# Patient Record
Sex: FEMALE | Race: WHITE | NOT HISPANIC OR LATINO | Employment: PART TIME | URBAN - METROPOLITAN AREA
[De-identification: names, ages, dates, MRNs, and addresses within clinical notes are randomized per-mention and may not be internally consistent; named-entity substitution may affect disease eponyms.]

---

## 2019-01-14 ENCOUNTER — OFFICE VISIT - HEALTHEAST (OUTPATIENT)
Dept: INTERNAL MEDICINE | Facility: CLINIC | Age: 69
End: 2019-01-14

## 2019-01-14 ENCOUNTER — COMMUNICATION - HEALTHEAST (OUTPATIENT)
Dept: TELEHEALTH | Facility: CLINIC | Age: 69
End: 2019-01-14

## 2019-01-14 DIAGNOSIS — Z83.3 FAMILY HISTORY OF DIABETES MELLITUS IN MOTHER: ICD-10-CM

## 2019-01-14 DIAGNOSIS — M79.644 CHRONIC THUMB PAIN, BILATERAL: ICD-10-CM

## 2019-01-14 DIAGNOSIS — R53.83 FATIGUE, UNSPECIFIED TYPE: ICD-10-CM

## 2019-01-14 DIAGNOSIS — G89.29 CHRONIC THUMB PAIN, BILATERAL: ICD-10-CM

## 2019-01-14 DIAGNOSIS — R12 HEARTBURN: ICD-10-CM

## 2019-01-14 DIAGNOSIS — M79.645 CHRONIC THUMB PAIN, BILATERAL: ICD-10-CM

## 2019-01-14 LAB
ALBUMIN SERPL-MCNC: 4 G/DL (ref 3.5–5)
ALP SERPL-CCNC: 87 U/L (ref 45–120)
ALT SERPL W P-5'-P-CCNC: 19 U/L (ref 0–45)
ANION GAP SERPL CALCULATED.3IONS-SCNC: 11 MMOL/L (ref 5–18)
AST SERPL W P-5'-P-CCNC: 24 U/L (ref 0–40)
BILIRUB SERPL-MCNC: 0.6 MG/DL (ref 0–1)
BUN SERPL-MCNC: 19 MG/DL (ref 8–22)
CALCIUM SERPL-MCNC: 10.3 MG/DL (ref 8.5–10.5)
CHLORIDE BLD-SCNC: 103 MMOL/L (ref 98–107)
CO2 SERPL-SCNC: 26 MMOL/L (ref 22–31)
CREAT SERPL-MCNC: 0.73 MG/DL (ref 0.6–1.1)
ERYTHROCYTE [DISTWIDTH] IN BLOOD BY AUTOMATED COUNT: 11.6 % (ref 11–14.5)
ERYTHROCYTE [SEDIMENTATION RATE] IN BLOOD BY WESTERGREN METHOD: 7 MM/HR (ref 0–20)
GFR SERPL CREATININE-BSD FRML MDRD: >60 ML/MIN/1.73M2
GLUCOSE BLD-MCNC: 91 MG/DL (ref 70–125)
HCT VFR BLD AUTO: 42.3 % (ref 35–47)
HGB BLD-MCNC: 14 G/DL (ref 12–16)
MCH RBC QN AUTO: 31.9 PG (ref 27–34)
MCHC RBC AUTO-ENTMCNC: 33.2 G/DL (ref 32–36)
MCV RBC AUTO: 96 FL (ref 80–100)
PLATELET # BLD AUTO: 305 THOU/UL (ref 140–440)
PMV BLD AUTO: 6.7 FL (ref 7–10)
POTASSIUM BLD-SCNC: 4.7 MMOL/L (ref 3.5–5)
PROT SERPL-MCNC: 6.9 G/DL (ref 6–8)
RBC # BLD AUTO: 4.4 MILL/UL (ref 3.8–5.4)
RHEUMATOID FACT SERPL-ACNC: <15 IU/ML (ref 0–30)
SODIUM SERPL-SCNC: 140 MMOL/L (ref 136–145)
TSH SERPL DL<=0.005 MIU/L-ACNC: 1.61 UIU/ML (ref 0.3–5)
WBC: 8 THOU/UL (ref 4–11)

## 2019-01-14 RX ORDER — ESOMEPRAZOLE MAGNESIUM 40 MG
40 CAPSULE,DELAYED RELEASE (ENTERIC COATED) ORAL
Qty: 90 CAPSULE | Refills: 3 | Status: SHIPPED | OUTPATIENT
Start: 2019-01-14 | End: 2021-11-04

## 2019-01-14 RX ORDER — ZOLPIDEM TARTRATE 10 MG/1
5 TABLET ORAL
Status: SHIPPED | COMMUNITY
Start: 2019-01-14 | End: 2021-11-04

## 2019-01-15 ENCOUNTER — COMMUNICATION - HEALTHEAST (OUTPATIENT)
Dept: INTERNAL MEDICINE | Facility: CLINIC | Age: 69
End: 2019-01-15

## 2019-02-13 ENCOUNTER — TELEPHONE (OUTPATIENT)
Dept: RHEUMATOLOGY | Facility: CLINIC | Age: 69
End: 2019-02-13

## 2019-02-13 NOTE — TELEPHONE ENCOUNTER
Referral received from Hank Vilchis MD at [facility] for fatigue and chronic thumb pain.   Sierra Wilson CMA  2/13/2019 2:45 PM

## 2019-02-19 ENCOUNTER — OFFICE VISIT - HEALTHEAST (OUTPATIENT)
Dept: RHEUMATOLOGY | Facility: CLINIC | Age: 69
End: 2019-02-19

## 2019-02-19 DIAGNOSIS — M79.644 CHRONIC THUMB PAIN, BILATERAL: ICD-10-CM

## 2019-02-19 DIAGNOSIS — G89.29 CHRONIC THUMB PAIN, BILATERAL: ICD-10-CM

## 2019-02-19 DIAGNOSIS — M15.0 PRIMARY OSTEOARTHRITIS INVOLVING MULTIPLE JOINTS: ICD-10-CM

## 2019-02-19 DIAGNOSIS — M79.645 CHRONIC THUMB PAIN, BILATERAL: ICD-10-CM

## 2019-02-19 RX ORDER — FUROSEMIDE 20 MG
20 TABLET ORAL 2 TIMES DAILY
Status: SHIPPED | COMMUNITY
Start: 2019-02-19 | End: 2021-11-04

## 2019-02-19 ASSESSMENT — MIFFLIN-ST. JEOR: SCORE: 1204.01

## 2019-03-18 ENCOUNTER — OFFICE VISIT - HEALTHEAST (OUTPATIENT)
Dept: INTERNAL MEDICINE | Facility: CLINIC | Age: 69
End: 2019-03-18

## 2019-03-18 DIAGNOSIS — Z86.32 HISTORY OF GESTATIONAL DIABETES: ICD-10-CM

## 2019-03-18 DIAGNOSIS — R41.3 MEMORY LOSS: ICD-10-CM

## 2019-03-18 DIAGNOSIS — M25.50 POLYARTHRALGIA: ICD-10-CM

## 2019-03-18 DIAGNOSIS — R53.82 CHRONIC FATIGUE: ICD-10-CM

## 2019-03-18 DIAGNOSIS — Z12.11 SCREEN FOR COLON CANCER: ICD-10-CM

## 2019-03-18 DIAGNOSIS — R82.90 ABNORMAL URINE ODOR: ICD-10-CM

## 2019-03-18 DIAGNOSIS — Z12.31 VISIT FOR SCREENING MAMMOGRAM: ICD-10-CM

## 2019-03-18 LAB
ALBUMIN UR-MCNC: NEGATIVE MG/DL
APPEARANCE UR: CLEAR
BACTERIA #/AREA URNS HPF: ABNORMAL HPF
BILIRUB UR QL STRIP: NEGATIVE
COLOR UR AUTO: YELLOW
GLUCOSE UR STRIP-MCNC: NEGATIVE MG/DL
HBA1C MFR BLD: 5.2 % (ref 3.5–6)
HGB UR QL STRIP: ABNORMAL
KETONES UR STRIP-MCNC: NEGATIVE MG/DL
LEUKOCYTE ESTERASE UR QL STRIP: NEGATIVE
NITRATE UR QL: NEGATIVE
PH UR STRIP: 6.5 [PH] (ref 5–8)
RBC #/AREA URNS AUTO: ABNORMAL HPF
SP GR UR STRIP: 1.01 (ref 1–1.03)
SQUAMOUS #/AREA URNS AUTO: ABNORMAL LPF
UROBILINOGEN UR STRIP-ACNC: ABNORMAL
WBC #/AREA URNS AUTO: ABNORMAL HPF

## 2019-03-19 ENCOUNTER — COMMUNICATION - HEALTHEAST (OUTPATIENT)
Dept: INTERNAL MEDICINE | Facility: CLINIC | Age: 69
End: 2019-03-19

## 2019-03-19 LAB
25(OH)D3 SERPL-MCNC: 47.2 NG/ML (ref 30–80)
CCP AB SER IA-ACNC: <0.5 U/ML

## 2019-03-20 ENCOUNTER — HOSPITAL ENCOUNTER (OUTPATIENT)
Dept: MAMMOGRAPHY | Facility: CLINIC | Age: 69
Discharge: HOME | End: 2019-03-20
Attending: INTERNAL MEDICINE

## 2019-03-20 DIAGNOSIS — Z12.31 VISIT FOR SCREENING MAMMOGRAM: ICD-10-CM

## 2019-03-25 ENCOUNTER — RECORDS - HEALTHEAST (OUTPATIENT)
Dept: RADIOLOGY | Facility: CLINIC | Age: 69
End: 2019-03-25

## 2019-08-21 ENCOUNTER — COMMUNICATION - HEALTHEAST (OUTPATIENT)
Dept: INTERNAL MEDICINE | Facility: CLINIC | Age: 69
End: 2019-08-21

## 2019-09-11 ENCOUNTER — COMMUNICATION - HEALTHEAST (OUTPATIENT)
Dept: INTERNAL MEDICINE | Facility: CLINIC | Age: 69
End: 2019-09-11

## 2019-10-30 ENCOUNTER — OFFICE VISIT - HEALTHEAST (OUTPATIENT)
Dept: FAMILY MEDICINE | Facility: CLINIC | Age: 69
End: 2019-10-30

## 2019-10-30 ENCOUNTER — COMMUNICATION - HEALTHEAST (OUTPATIENT)
Dept: TELEHEALTH | Facility: CLINIC | Age: 69
End: 2019-10-30

## 2019-10-30 DIAGNOSIS — Z76.89 ENCOUNTER TO ESTABLISH CARE: ICD-10-CM

## 2019-10-30 DIAGNOSIS — R12 HEARTBURN: ICD-10-CM

## 2019-10-30 DIAGNOSIS — F41.9 ANXIETY: ICD-10-CM

## 2019-10-30 DIAGNOSIS — G47.00 INSOMNIA, UNSPECIFIED TYPE: ICD-10-CM

## 2019-10-30 DIAGNOSIS — M15.0 PRIMARY OSTEOARTHRITIS INVOLVING MULTIPLE JOINTS: ICD-10-CM

## 2019-10-30 RX ORDER — HYDROXYZINE HYDROCHLORIDE 25 MG/1
12.5-25 TABLET, FILM COATED ORAL EVERY 8 HOURS PRN
Qty: 30 TABLET | Refills: 0 | Status: SHIPPED | OUTPATIENT
Start: 2019-10-30 | End: 2021-11-04

## 2019-10-30 RX ORDER — CELECOXIB 100 MG/1
100 CAPSULE ORAL DAILY
Qty: 30 CAPSULE | Refills: 2 | Status: SHIPPED | OUTPATIENT
Start: 2019-10-30 | End: 2021-11-04

## 2019-10-30 ASSESSMENT — ANXIETY QUESTIONNAIRES
7. FEELING AFRAID AS IF SOMETHING AWFUL MIGHT HAPPEN: NOT AT ALL
5. BEING SO RESTLESS THAT IT IS HARD TO SIT STILL: NOT AT ALL
6. BECOMING EASILY ANNOYED OR IRRITABLE: SEVERAL DAYS
1. FEELING NERVOUS, ANXIOUS, OR ON EDGE: SEVERAL DAYS
2. NOT BEING ABLE TO STOP OR CONTROL WORRYING: NOT AT ALL
GAD7 TOTAL SCORE: 3
3. WORRYING TOO MUCH ABOUT DIFFERENT THINGS: SEVERAL DAYS
4. TROUBLE RELAXING: NOT AT ALL

## 2019-10-30 ASSESSMENT — PATIENT HEALTH QUESTIONNAIRE - PHQ9: SUM OF ALL RESPONSES TO PHQ QUESTIONS 1-9: 7

## 2019-10-30 ASSESSMENT — MIFFLIN-ST. JEOR: SCORE: 1231.22

## 2019-11-01 ENCOUNTER — TELEPHONE (OUTPATIENT)
Dept: RHEUMATOLOGY | Facility: CLINIC | Age: 69
End: 2019-11-01

## 2019-11-01 NOTE — TELEPHONE ENCOUNTER
[facility] Call Center    Phone Message    May a detailed message be left on voicemail: yes    Reason for Call: Other: Primary Osteoarthritis involving multiple joints, ref by [facility]. Recs available through [facility]     Action Taken: Message routed to:  [facility] ([facility]): Rheum

## 2019-11-01 NOTE — LETTER
November 13, 2019    Veronica Collado  [address] [place] [address] [address] [place] [address]    Dear Referring Provider,  Thank you for the referral. We are currently short staffed in the department and are working on recruitment.   We encourage you to refer your patient, Veronica Collado, to one of our community sites:    [facility]    Provider of your choice            Thank you for your support and understanding.    Sincerely,  The Division of Arthritis and Autoimmune Disorders

## 2019-11-13 NOTE — TELEPHONE ENCOUNTER
Rheumatologist has reviewed chart and has made the determination to sent the patient to community. A letter has been sent the referring encouraging them to send the patient to one of our community sites. Patient needs to follow up with their referring or PCP for further direction.     Sierra Wilson CMA   11/13/2019 10:49 AM

## 2020-01-08 ENCOUNTER — COMMUNICATION - HEALTHEAST (OUTPATIENT)
Dept: ADMINISTRATIVE | Facility: CLINIC | Age: 70
End: 2020-01-08

## 2020-02-20 ENCOUNTER — COMMUNICATION - HEALTHEAST (OUTPATIENT)
Dept: TELEHEALTH | Facility: CLINIC | Age: 70
End: 2020-02-20

## 2020-02-20 ENCOUNTER — OFFICE VISIT - HEALTHEAST (OUTPATIENT)
Dept: FAMILY MEDICINE | Facility: CLINIC | Age: 70
End: 2020-02-20

## 2020-02-20 DIAGNOSIS — Z12.11 SCREEN FOR COLON CANCER: ICD-10-CM

## 2020-02-20 DIAGNOSIS — R05.9 COUGH: ICD-10-CM

## 2020-02-20 ASSESSMENT — MIFFLIN-ST. JEOR: SCORE: 1219.88

## 2020-03-02 ENCOUNTER — OFFICE VISIT - HEALTHEAST (OUTPATIENT)
Dept: FAMILY MEDICINE | Facility: CLINIC | Age: 70
End: 2020-03-02

## 2020-03-02 ENCOUNTER — COMMUNICATION - HEALTHEAST (OUTPATIENT)
Dept: TELEHEALTH | Facility: CLINIC | Age: 70
End: 2020-03-02

## 2020-03-02 DIAGNOSIS — R05.9 COUGH: ICD-10-CM

## 2020-03-02 RX ORDER — ALBUTEROL SULFATE 90 UG/1
2 AEROSOL, METERED RESPIRATORY (INHALATION) EVERY 6 HOURS PRN
Qty: 18 G | Refills: 2 | Status: SHIPPED | OUTPATIENT
Start: 2020-03-02 | End: 2021-11-04

## 2020-03-02 ASSESSMENT — MIFFLIN-ST. JEOR: SCORE: 1225.33

## 2020-04-05 ENCOUNTER — RECORDS - HEALTHEAST (OUTPATIENT)
Dept: ADMINISTRATIVE | Facility: OTHER | Age: 70
End: 2020-04-05

## 2020-06-29 ENCOUNTER — RECORDS - HEALTHEAST (OUTPATIENT)
Dept: ADMINISTRATIVE | Facility: OTHER | Age: 70
End: 2020-06-29

## 2020-06-30 ENCOUNTER — RECORDS - HEALTHEAST (OUTPATIENT)
Dept: ADMINISTRATIVE | Facility: OTHER | Age: 70
End: 2020-06-30

## 2020-11-20 ENCOUNTER — RECORDS - HEALTHEAST (OUTPATIENT)
Dept: ADMINISTRATIVE | Facility: OTHER | Age: 70
End: 2020-11-20

## 2020-12-02 ENCOUNTER — RECORDS - HEALTHEAST (OUTPATIENT)
Dept: ADMINISTRATIVE | Facility: OTHER | Age: 70
End: 2020-12-02

## 2021-01-26 ENCOUNTER — OFFICE VISIT - HEALTHEAST (OUTPATIENT)
Dept: FAMILY MEDICINE | Facility: CLINIC | Age: 71
End: 2021-01-26

## 2021-01-26 DIAGNOSIS — Z20.822 EXPOSURE TO 2019 NOVEL CORONAVIRUS: ICD-10-CM

## 2021-01-26 DIAGNOSIS — R53.83 FATIGUE, UNSPECIFIED TYPE: ICD-10-CM

## 2021-01-26 DIAGNOSIS — R79.9 ABNORMAL FINDING OF BLOOD CHEMISTRY, UNSPECIFIED: ICD-10-CM

## 2021-01-26 DIAGNOSIS — E03.9 ACQUIRED HYPOTHYROIDISM: ICD-10-CM

## 2021-01-26 DIAGNOSIS — K21.9 GASTROESOPHAGEAL REFLUX DISEASE WITHOUT ESOPHAGITIS: ICD-10-CM

## 2021-01-26 DIAGNOSIS — G47.00 INSOMNIA, UNSPECIFIED TYPE: ICD-10-CM

## 2021-01-26 LAB
ALBUMIN SERPL-MCNC: 3.9 G/DL (ref 3.5–5)
ALP SERPL-CCNC: 86 U/L (ref 45–120)
ALT SERPL W P-5'-P-CCNC: 18 U/L (ref 0–45)
ANION GAP SERPL CALCULATED.3IONS-SCNC: 9 MMOL/L (ref 5–18)
AST SERPL W P-5'-P-CCNC: 23 U/L (ref 0–40)
BASOPHILS # BLD AUTO: 0 THOU/UL (ref 0–0.2)
BASOPHILS NFR BLD AUTO: 0 % (ref 0–2)
BILIRUB SERPL-MCNC: 0.4 MG/DL (ref 0–1)
BUN SERPL-MCNC: 19 MG/DL (ref 8–22)
C REACTIVE PROTEIN LHE: 0.3 MG/DL (ref 0–0.8)
CALCIUM SERPL-MCNC: 9.8 MG/DL (ref 8.5–10.5)
CHLORIDE BLD-SCNC: 103 MMOL/L (ref 98–107)
CO2 SERPL-SCNC: 29 MMOL/L (ref 22–31)
CREAT SERPL-MCNC: 0.66 MG/DL (ref 0.6–1.1)
EOSINOPHIL # BLD AUTO: 0.2 THOU/UL (ref 0–0.4)
EOSINOPHIL NFR BLD AUTO: 3 % (ref 0–6)
ERYTHROCYTE [DISTWIDTH] IN BLOOD BY AUTOMATED COUNT: 11.1 % (ref 11–14.5)
ERYTHROCYTE [SEDIMENTATION RATE] IN BLOOD BY WESTERGREN METHOD: 11 MM/HR (ref 0–20)
FERRITIN SERPL-MCNC: 136 NG/ML (ref 10–130)
GFR SERPL CREATININE-BSD FRML MDRD: >60 ML/MIN/1.73M2
GLUCOSE BLD-MCNC: 91 MG/DL (ref 70–125)
HBA1C MFR BLD: 5.3 %
HCT VFR BLD AUTO: 39.2 % (ref 35–47)
HGB BLD-MCNC: 13.2 G/DL (ref 12–16)
IRON SATN MFR SERPL: 32 % (ref 20–50)
IRON SERPL-MCNC: 94 UG/DL (ref 42–175)
LYMPHOCYTES # BLD AUTO: 1.5 THOU/UL (ref 0.8–4.4)
LYMPHOCYTES NFR BLD AUTO: 24 % (ref 20–40)
MCH RBC QN AUTO: 32.7 PG (ref 27–34)
MCHC RBC AUTO-ENTMCNC: 33.8 G/DL (ref 32–36)
MCV RBC AUTO: 97 FL (ref 80–100)
MONOCYTES # BLD AUTO: 0.5 THOU/UL (ref 0–0.9)
MONOCYTES NFR BLD AUTO: 8 % (ref 2–10)
NEUTROPHILS # BLD AUTO: 4.1 THOU/UL (ref 2–7.7)
NEUTROPHILS NFR BLD AUTO: 65 % (ref 50–70)
PLATELET # BLD AUTO: 273 THOU/UL (ref 140–440)
PMV BLD AUTO: 7.2 FL (ref 7–10)
POTASSIUM BLD-SCNC: 4.2 MMOL/L (ref 3.5–5)
PROT SERPL-MCNC: 6.8 G/DL (ref 6–8)
RBC # BLD AUTO: 4.05 MILL/UL (ref 3.8–5.4)
SODIUM SERPL-SCNC: 141 MMOL/L (ref 136–145)
TIBC SERPL-MCNC: 293 UG/DL (ref 313–563)
TRANSFERRIN SERPL-MCNC: 234 MG/DL (ref 212–360)
TSH SERPL DL<=0.005 MIU/L-ACNC: 2.29 UIU/ML (ref 0.3–5)
VIT B12 SERPL-MCNC: 1576 PG/ML (ref 213–816)
WBC: 6.3 THOU/UL (ref 4–11)

## 2021-01-26 RX ORDER — ESOMEPRAZOLE MAGNESIUM 40 MG/1
40 CAPSULE, DELAYED RELEASE ORAL
Status: SHIPPED | COMMUNITY
Start: 2020-07-15 | End: 2021-07-15

## 2021-01-26 ASSESSMENT — MIFFLIN-ST. JEOR: SCORE: 1229.63

## 2021-01-27 LAB — 25(OH)D3 SERPL-MCNC: 53.7 NG/ML (ref 30–80)

## 2021-01-28 ENCOUNTER — COMMUNICATION - HEALTHEAST (OUTPATIENT)
Dept: FAMILY MEDICINE | Facility: CLINIC | Age: 71
End: 2021-01-28

## 2021-01-29 ENCOUNTER — COMMUNICATION - HEALTHEAST (OUTPATIENT)
Dept: SCHEDULING | Facility: CLINIC | Age: 71
End: 2021-01-29

## 2021-02-01 ENCOUNTER — COMMUNICATION - HEALTHEAST (OUTPATIENT)
Dept: ADMINISTRATIVE | Facility: CLINIC | Age: 71
End: 2021-02-01

## 2021-02-22 ENCOUNTER — COMMUNICATION - HEALTHEAST (OUTPATIENT)
Dept: FAMILY MEDICINE | Facility: CLINIC | Age: 71
End: 2021-02-22

## 2021-04-20 ENCOUNTER — OFFICE VISIT - HEALTHEAST (OUTPATIENT)
Dept: FAMILY MEDICINE | Facility: CLINIC | Age: 71
End: 2021-04-20

## 2021-04-20 DIAGNOSIS — R10.31 RLQ ABDOMINAL PAIN: ICD-10-CM

## 2021-04-20 LAB
ALBUMIN UR-MCNC: NEGATIVE G/DL
APPEARANCE UR: CLEAR
BASOPHILS # BLD AUTO: 0.1 THOU/UL (ref 0–0.2)
BASOPHILS NFR BLD AUTO: 1 % (ref 0–2)
BILIRUB UR QL STRIP: NEGATIVE
COLOR UR AUTO: YELLOW
EOSINOPHIL # BLD AUTO: 0.2 THOU/UL (ref 0–0.4)
EOSINOPHIL NFR BLD AUTO: 3 % (ref 0–6)
ERYTHROCYTE [DISTWIDTH] IN BLOOD BY AUTOMATED COUNT: 12.8 % (ref 11–14.5)
GLUCOSE UR STRIP-MCNC: NEGATIVE MG/DL
HCT VFR BLD AUTO: 44.4 % (ref 35–47)
HGB BLD-MCNC: 14.7 G/DL (ref 12–16)
HGB UR QL STRIP: NEGATIVE
IMM GRANULOCYTES # BLD: 0 THOU/UL
IMM GRANULOCYTES NFR BLD: 0 %
KETONES UR STRIP-MCNC: NEGATIVE MG/DL
LEUKOCYTE ESTERASE UR QL STRIP: NEGATIVE
LYMPHOCYTES # BLD AUTO: 1.7 THOU/UL (ref 0.8–4.4)
LYMPHOCYTES NFR BLD AUTO: 28 % (ref 20–40)
MCH RBC QN AUTO: 31.7 PG (ref 27–34)
MCHC RBC AUTO-ENTMCNC: 33.1 G/DL (ref 32–36)
MCV RBC AUTO: 96 FL (ref 80–100)
MONOCYTES # BLD AUTO: 0.6 THOU/UL (ref 0–0.9)
MONOCYTES NFR BLD AUTO: 10 % (ref 2–10)
NEUTROPHILS # BLD AUTO: 3.5 THOU/UL (ref 2–7.7)
NEUTROPHILS NFR BLD AUTO: 59 % (ref 50–70)
NITRATE UR QL: NEGATIVE
PH UR STRIP: 5.5 [PH] (ref 5–8)
PLATELET # BLD AUTO: 296 THOU/UL (ref 140–440)
PMV BLD AUTO: 8.4 FL (ref 7–10)
RBC # BLD AUTO: 4.64 MILL/UL (ref 3.8–5.4)
SP GR UR STRIP: 1.02 (ref 1–1.03)
UROBILINOGEN UR STRIP-ACNC: NORMAL
WBC: 6 THOU/UL (ref 4–11)

## 2021-05-26 ASSESSMENT — PATIENT HEALTH QUESTIONNAIRE - PHQ9: SUM OF ALL RESPONSES TO PHQ QUESTIONS 1-9: 7

## 2021-05-28 ASSESSMENT — ANXIETY QUESTIONNAIRES: GAD7 TOTAL SCORE: 3

## 2021-06-01 NOTE — TELEPHONE ENCOUNTER
Left message informing pt that Dr. Acosta is not taking new patients, the only provider we have taking new patients is Dr. Gabriel Vilchis. If she is looking specifically for a female internist, the only clinics we have that fit that criteria would be either [facility] or the [facility]. Left message to call back if she has further questions

## 2021-06-01 NOTE — TELEPHONE ENCOUNTER
New Appointment Needed  What is the reason for the visit:    Would like to establish care since Dr. Muñoz no longer practices with [facility].  Provider Preference: Dr. Acosta, declined other providers.  How soon do you need to be seen?: anytime this month  Waitlist offered?: No  Okay to leave a detailed message:  Yes

## 2021-06-02 ENCOUNTER — COMMUNICATION - HEALTHEAST (OUTPATIENT)
Dept: FAMILY MEDICINE | Facility: CLINIC | Age: 71
End: 2021-06-02

## 2021-06-02 VITALS — BODY MASS INDEX: 32.61 KG/M2 | WEIGHT: 167 LBS

## 2021-06-02 VITALS — HEIGHT: 60 IN | BODY MASS INDEX: 32.79 KG/M2 | WEIGHT: 167 LBS

## 2021-06-02 VITALS — BODY MASS INDEX: 33.94 KG/M2 | WEIGHT: 173.8 LBS

## 2021-06-02 NOTE — PROGRESS NOTES
Assessment/plan   Veronica Collado is a 68 y.o. female who is new to my practice.    Veronica was seen today for establish care, anxiety, acid reflux and medication refill.    Diagnoses and all orders for this visit:    Encounter to establish care    Heartburn  Extensive conversation about her heartburn history.  She is agreeable to an EGD after the first the year to reevaluate this.  She uses Nexium and was quite surprised to learn that esomeprazole is the active ingredient in her Nexium.  Regardless, we will continue to send brand-name dispense as written Nexium as needed, though this was last filled 1/2019 with 90 capsules and she can fill this again if she needs.  She uses infrequently-about 90 times in the past year.  We did  that her excessive NSAID use is undesirable with history of GERD and would advise against this.  She is resistant to the idea of Tylenol as she has found it ineffective in the past.  I have recommended that we switch her NSAID use to Celebrex to at least provide some potential benefit protection.  -     Ambulatory referral for Upper GI Endoscopy    Fatigue, Insomnia, unspecified type  She describes daytime somnolence as well as extreme fatigue to the point where she is falling asleep at her desk.  Difficult to know whether this is obstructive sleep apnea versus sleep latency or other sleep rhythm disturbances.   She has declined to meet with sleep therapy at this time.  Her fatigue was not fully reassessed today though we did clarify which took some time her regimen of zolpidem.  She takes 2.5 to 5 mg about 5 times per month.  She uses this from a 10 mg pill size and just cuts into quarters as needed.  She does not need a refill, last filled for about 90 tablets which she anticipates to be almost a 2-year supply which [facility]    Anxiety  PHQ 9 score of 7, but this is primarily for fatigue and poor energy which creates trouble with concentration, scottie 7 score  is 3 for feeling nervous, worrying and becoming easily annoyed.  She is requesting Ativan today though has not tried other options like hydroxyzine or gabapentin for as needed anxiety management.  She strongly opposed to SSRI as she feels she can otherwise do well with her symptoms and less things are starting to build up.  She has previously tolerated Ativan about 15 tablets over the past 10 years.  It is reasonable to consider this, though I would prefer if she can of hydroxyzine or gabapentin a trial first.  She was reluctantly agreeable and after risk-benefit conversation of side effects we have agreed to start hydroxyzine 12.5 to 25 mg every 8 hours as needed for her.  If this is ineffective, consideration at that time may be made for a small prescription of Ativan low-dose which she previously tolerated 10 years ago.  -     hydrOXYzine HCl (ATARAX) 25 MG tablet; Take 0.5-1 tablets (12.5-25 mg total) by mouth every 8 (eight) hours as needed for anxiety.    Primary osteoarthritis involving multiple joints  She has had previous work-up through previous providers and also rheumatology.  Labs have been unremarkable including RF, ESR and CCP antibodies.  She today brings up concern for possible psoriatic arthritis based on a skin finding which she does not have present today but is going to meet with her dermatologist when it flares up again.  If the dermatologist feels that was a psoriatic lesion, she will then desire to pursue referral with rheumatology to another provider for further evaluation.  I have counseled against use of NSAIDs but she insists on taking and in light of her heartburn I have opted to switch her to the Celebrex.  She declines Tylenol.  -     celecoxib (CELEBREX) 100 MG capsule; Take 1 capsule (100 mg total) by mouth daily. Ok to take twice daily sparingly.  -     Ambulatory referral to Rheumatology    I spent 45 minutes with the patient, >50% of which was in counseling regarding the patient's  "medical issues as noted above.    Follow up: 3-6 months    Jessica Russo MD    Subjective:      HPI: Veronica Collado is a 68 y.o. female who is here for:    Chief Complaint   Patient presents with     Establish Care     Anxiety     acid reflux     Medication Refill       Establish care:   She recently had an establish care visit 3/18/2019 with a provider who is no longer at the clinic and thus is establishing here.  Her note was reviewed.    Requesting ativan for\"when everything builds up\". She had 30 pills of low dose Ativan 10 years ago, and had 15 pills left. No side effects.  She really does not want to be on a chronic daily medication.  Has never tried hydroxyzine or gabapentin.  Her  is on disability with progressive motor disease: peripheral lateral sclerosis condition[facility]. Has been 16 years of this progression; he is in a wheel chair.   She does everything to maintain the house.  When all of her responsibilities pileup which is infrequent, she feels like she needs something to help with these in the acute moment.  She has tolerated Ativan in the past and for this reason is wondering if she could get a refill of this.  [facility] due [facility] her 's condition.    She has chronic reflux, has been somewhat worse recently.  She states that the generic esomeprazole does not work for her \"makes me sick\", needs to use brand name Nexium- takes only as needed and hasn't need to refill it since Jan 14th when the 90 capsules were sent.  No dysphagia.  No blood in stool or melena.  No change in bowels or constipation.   Omeprazole has been ineffective for her and also by her report caused nausea.   She is going to call for refill of her Nexium when due.    Patient states she has had a scope of her stomach in the past due to voice hoarseness which she is noticing again. Unknown if they look for Rod's or other abnormality such as that. Summer 2019 had a 2 " week period of nausea.     She does continue to take 6 tablets of ibuprofen or other NSAIDs on a moderate basis for her arthritis symptoms.  She states Tylenol is ineffective.  She understands she is been recommended to avoid NSAID use.    Chronic fatigue and sleeping issues.    This fatigue has been for a while not but worse after [place] of 2018.   Takes Zolpidem - she only takes 2.5 to 5mg at a time, but previously was given dose of 10mg so she could cut these in half. Uses 5-6 5mg doses or less per month. States she was given [facility], declines need for refill at this time. She feels this is nearly a 2 year supply for her.   She takes these only if absolutely cannot fall asleep. Even with this she can't sleep through the night.   Nocturia every 1-3 hours. Up 3x/night, but with sleeping pill 2x/night.  She does have daytime somnolence in the AM and PM even when she is working at her desk.   Takes if she has a long shift or 12hr drive coming up.     She takes lasix periodically for swelling or if joints are achy. Has taken a handful of times only. No heartfailure.  This was last filled 10/2018 as well [facility], still has many left.     Patient has a history polyarthralgia. Her wrists and base of her thumb hurt, her knees and recently her elbows start to hurt. She has seen rheumatology and they felt she had OA. Her labs are normal.  She mentions she has been reading more about psoriatic arthritis and wondered if that might be her condition.  She intermittently will have a patch on her left forearm which is not present today and she is going to ask her dermatologist about this first.      She listens to things over and over again-listening to English learners.     She states she had a colonoscopy in 2014, no history of polyp and she states that was negative and a 10-year follow-up recommended.  No family history of colon cancer.      Review of  Systems:  + chronic fatigue, poor sleep, nocturia, polyarthralgia, intermittent swelling of her joints  12 point comprehensive review of systems was negative except as noted and HPI     Social History:  Social History     Patient does not qualify to have social determinant information on file (likely too young).   Social History Narrative    She does work.  Her  is on disability with progressive motor disease: peripheral lateral sclerosis condition[facility]. Has been 16 years of this progression; he is in a wheel chair.     She does everything to maintain the house.     Former smoker.    Jessica Russo MD       Medical History:  Patient Active Problem List   Diagnosis     Family history of diabetes mellitus in mother     Chronic thumb pain, bilateral     Fatigue, unspecified type     Heartburn     Primary osteoarthritis involving multiple joints     History reviewed. No pertinent past medical history.  Past Surgical History:   Procedure Laterality Date     CATARACT EXTRACTION, BILATERAL Bilateral 2009     KNEE CARTILAGE SURGERY Right 2011     Patient has no known allergies.  Family History   Problem Relation Age of Onset     Diabetes Mother        Medications:  Current Outpatient Medications   Medication Sig Dispense Refill     b complex vitamins tablet Take 1 tablet by mouth daily.       cholecalciferol, vitamin D3, (VITAMIN D3 ORAL) Take by mouth.       diclofenac sodium (VOLTAREN TOP) Apply topically.       furosemide (LASIX) 20 MG tablet Take 20 mg by mouth 2 (two) times a day.       GLUCOSAMINE HCL ORAL Take by mouth.       multivit-minerals/ferrous gluc (MULTI-VALORIE ORAL) Take by mouth.       NEXIUM 40 mg capsule Take 1 capsule (40 mg total) by mouth daily before breakfast. 90 capsule 3     zolpidem (AMBIEN) 10 mg tablet Take 5 mg by mouth at bedtime as needed for sleep.       celecoxib (CELEBREX) 100 MG capsule Take 1 capsule (100 mg total) by mouth daily. Ok to take twice daily  sparingly. 30 capsule 2     hydrOXYzine HCl (ATARAX) 25 MG tablet Take 0.5-1 tablets (12.5-25 mg total) by mouth every 8 (eight) hours as needed for anxiety. 30 tablet 0     No current facility-administered medications for this visit.        Imaging & Labs reviewed this visit:     1/14/2019 labs included a normal BMP, normal LFTs, vitamin D normal, A1c 5.2% 3/2019.  TSH normal.  Hemoglobin normal.  CCP, ESR, RF all normal within the past year.    Objective:      Vitals:    10/30/19 1003   BP: 122/80   Pulse: 64   Weight: 173 lb (78.5 kg)   Height: 5' (1.524 m)       Physical Exam:     General: Alert, no acute distress.  Pressured speech pattern, pleasant, high energy person.  HEENT: normocephalic conjunctivae are clear  Neck: supple without adenopathy or thyromegaly.  Lungs: Good aeration bilaterally. Clear to auscultation without wheezes, rales or rhonci.   Heart: regular rate and rhythm, normal S1 and S2, no murmurs  Abdomen: soft and nontender  Skin: clear without rash or lesions  Msk: pain with palpation over the CMC joints bilaterally.  Neuro: alert, interactive moving all extremities equally      Jessica Russo MD

## 2021-06-03 VITALS
HEART RATE: 64 BPM | DIASTOLIC BLOOD PRESSURE: 80 MMHG | BODY MASS INDEX: 33.96 KG/M2 | HEIGHT: 60 IN | SYSTOLIC BLOOD PRESSURE: 122 MMHG | WEIGHT: 173 LBS

## 2021-06-04 VITALS
WEIGHT: 170.5 LBS | SYSTOLIC BLOOD PRESSURE: 148 MMHG | HEART RATE: 77 BPM | OXYGEN SATURATION: 97 % | TEMPERATURE: 98.5 F | DIASTOLIC BLOOD PRESSURE: 82 MMHG | BODY MASS INDEX: 33.47 KG/M2 | HEIGHT: 60 IN

## 2021-06-04 VITALS
HEART RATE: 71 BPM | WEIGHT: 171.7 LBS | HEIGHT: 60 IN | OXYGEN SATURATION: 98 % | SYSTOLIC BLOOD PRESSURE: 112 MMHG | DIASTOLIC BLOOD PRESSURE: 76 MMHG | BODY MASS INDEX: 33.71 KG/M2

## 2021-06-05 VITALS
OXYGEN SATURATION: 97 % | DIASTOLIC BLOOD PRESSURE: 78 MMHG | SYSTOLIC BLOOD PRESSURE: 114 MMHG | HEIGHT: 60 IN | WEIGHT: 174.4 LBS | BODY MASS INDEX: 34.24 KG/M2 | HEART RATE: 71 BPM

## 2021-06-05 VITALS
BODY MASS INDEX: 34.36 KG/M2 | OXYGEN SATURATION: 98 % | TEMPERATURE: 98.4 F | SYSTOLIC BLOOD PRESSURE: 119 MMHG | DIASTOLIC BLOOD PRESSURE: 81 MMHG | RESPIRATION RATE: 18 BRPM | HEART RATE: 65 BPM | WEIGHT: 173 LBS

## 2021-06-06 NOTE — PROGRESS NOTES
ASSESSMENT:  1. Cough    Because of the length of time she has had this, and she is not really taking it I did give her some prednisone to take in a tapering fashion over the next 10 days or so.  She states that she has something similar to this in the past and it worked well for her so I would imagine this would be very helpful to get her to get over this in a more efficient manner.  I did also give her a Z-Jack that she can use as directed in addition to this which I think will augment her improvement.  Chest x-ray was read by me and by the radiologist as being negative for pneumonia.    If she is not getting better with this then she will let us know and we can see her again.    - XR Chest 2 Views  - azithromycin (ZITHROMAX Z-JACK) 250 MG tablet; 2 tabs (500 mg ) day #1, then 1 tab (250 mg) days #2-5, total 5 days  Dispense: 6 tablet; Refill: 0  - predniSONE (DELTASONE) 10 mg tablet; Take 30 mg by mouth daily for 3 days, THEN 20 mg daily for 3 days, THEN 10 mg daily for 3 days.  Dispense: 18 tablet; Refill: 0    2. Screen for colon cancer    - Ambulatory referral for Colonoscopy          PLAN:  There are no Patient Instructions on file for this visit.    Orders Placed This Encounter   Procedures     XR Chest 2 Views     Order Specific Question:   Can the procedure be changed per Radiologist protocol?     Answer:   Yes     Ambulatory referral for Colonoscopy     Referral Priority:   Routine     Referral Type:   Colonoscopy     Referral Reason:   Evaluation and Treatment     Requested Specialty:   Gastroenterology     Number of Visits Requested:   1     There are no discontinued medications.    No follow-ups on file.    CHIEF COMPLAINT:  Chief Complaint   Patient presents with     Cough     Was quarant[place] on a cruise ship for influenza - was not tested because they ran out of testing supplies - given Tamiflu - cough started December 8th - Cruise from 1/19-2/3, cough - not productive.       HISTORY OF PRESENT  ILLNESS:  Veronica is a 69 y.o. female presenting to the clinic today for a cough that she states is been going on now for almost 10 weeks.  She states that she was exposed to someone who had a very aggressive cough in the early part of December and she got sick after that.  She is been doing a lot of traveling this winter and has had a couple of trips to [place] and then a cruise in the middle of that.  She is gotten sick a couple of times or at 7 1 persistent ongoing cough.  She is not coughing up all that much anymore.  She does not have fever or chills.  She was on a cruise ship in the [facility] in December and was actually quarantined for a couple of days because there was a lot of people that had influenza.  She was not tested because they ran out of rapid testing supplies but she was given Tamiflu.  Now she is having the cough persistent, and is not getting better.  She is not coughing up much sputum.  No fevers as mentioned no ear pain she does have a continuous sore throat.  Appetite has been good no vomiting or diarrhea.    REVIEW OF SYSTEMS:     All other systems are negative.    PFSH:        TOBACCO USE:  Social History     Tobacco Use   Smoking Status Former Smoker     Years: 10.00     Types: Cigarettes   Smokeless Tobacco Never Used   Tobacco Comment    Occasional smoking for a month at a time since quitting       VITALS:  Vitals:    02/20/20 1437   BP: 148/82   Patient Site: Left Arm   Patient Position: Sitting   Cuff Size: Adult Regular   Pulse: 77   Temp: 98.5  F (36.9  C)   SpO2: 97%   Weight: 170 lb 8 oz (77.3 kg)   Height: 5' (1.524 m)     Wt Readings from Last 3 Encounters:   02/20/20 170 lb 8 oz (77.3 kg)   10/30/19 173 lb (78.5 kg)   03/18/19 173 lb 12.8 oz (78.8 kg)     Body mass index is 33.3 kg/m .    PHYSICAL EXAM:  Constitutional:  Well appearing patient in no acute distress.  Vitals:  Per nursing notes.    HEENT:  Normocephalic, atraumatic.  Ears are clear bilaterally, with no fluid or  redness, and landmarks visible.  Pupils are equal and reactive to light, extraocular muscles intact, visual fields are full.  Nose is normal, and oropharynx is clear without redness.    Neck is without lymphadenopathy.    Lungs:  Clear to auscultation bilaterally without wheezes, rales or rhonchi.   Cardiac:  Regular rate and rhythm without murmurs, rubs, or gallops.       Xr Chest 2 Views    Result Date: 2/20/2020  EXAM: XR CHEST 2 VIEWS LOCATION: [facility] DATE/TIME: 2/20/2020 2:57 PM INDICATION: Cough COMPARISON: None.     The upper apices are excluded on this exam. Lungs otherwise clear with exam otherwise negative.           MEDICATIONS:  Current Outpatient Medications   Medication Sig Dispense Refill     b complex vitamins tablet Take 1 tablet by mouth daily.       celecoxib (CELEBREX) 100 MG capsule Take 1 capsule (100 mg total) by mouth daily. Ok to take twice daily sparingly. 30 capsule 2     cholecalciferol, vitamin D3, (VITAMIN D3 ORAL) Take by mouth.       diclofenac sodium (VOLTAREN TOP) Apply topically.       furosemide (LASIX) 20 MG tablet Take 20 mg by mouth 2 (two) times a day.       GLUCOSAMINE HCL ORAL Take by mouth.       hydrOXYzine HCl (ATARAX) 25 MG tablet Take 0.5-1 tablets (12.5-25 mg total) by mouth every 8 (eight) hours as needed for anxiety. 30 tablet 0     multivit-minerals/ferrous gluc (MULTI-VALORIE ORAL) Take by mouth.       NEXIUM 40 mg capsule Take 1 capsule (40 mg total) by mouth daily before breakfast. 90 capsule 3     zolpidem (AMBIEN) 10 mg tablet Take 5 mg by mouth at bedtime as needed for sleep.       azithromycin (ZITHROMAX Z-JACK) 250 MG tablet 2 tabs (500 mg ) day #1, then 1 tab (250 mg) days #2-5, total 5 days 6 tablet 0     predniSONE (DELTASONE) 10 mg tablet Take 30 mg by mouth daily for 3 days, THEN 20 mg daily for 3 days, THEN 10 mg daily for 3 days. 18 tablet 0     No current facility-administered medications for this visit.

## 2021-06-06 NOTE — PROGRESS NOTES
ASSESSMENT:  1. Cough    I do not think she needs any more antibiotics at this point.  I did give her an inhaler which I hope will help her at the times that she feels that she is having a lot of coughing.  I also gave her a Medrol Dosepak instead of the prednisone that she had last time as that really did not seem to sit well with her.  Hopefully this will help open her up a little bit and the albuterol will help her cough some.    As she is leaving town in 5 days, hopefully this will tide her over until she gets back, and if she is still having trouble then, I might think about some further imaging or a pulmonary function test to further elucidate what is going on with this cough.  I reassured her again that the x-ray we did last time was negative.      - methylPREDNISolone (MEDROL DOSEPACK) 4 mg tablet; follow package directions  Dispense: 21 tablet; Refill: 0  - albuterol (PROAIR HFA;PROVENTIL HFA;VENTOLIN HFA) 90 mcg/actuation inhaler; Inhale 2 puffs every 6 (six) hours as needed for wheezing.  Dispense: 18 g; Refill: 2          PLAN:  There are no Patient Instructions on file for this visit.    No orders of the defined types were placed in this encounter.    There are no discontinued medications.    No follow-ups on file.    CHIEF COMPLAINT:  Chief Complaint   Patient presents with     Cough     Coughing still       HISTORY OF PRESENT ILLNESS:  Veronica is a 69 y.o. female presenting to the clinic today with a persistent cough.  I saw her about 10 days ago and gave her an antibiotic and some prednisone.  She has had to have a similar course before with her breathing.  In the past, she states that the prednisone will give her some energy and take care of her wheezing but this time it did not.  It seemed to actually make her more tired which she was a bit confused by and the reaction that she had to these prednisone did not seem to be consistent with what she has experienced in the past.  She continues to have the  cough and does not feel like she is really any better than she was the last time she was here.    No fevers or chills.  Her appetite is been good.  No nausea vomiting or diarrhea.  No ear pain no sore throat.    REVIEW OF SYSTEMS:     All other systems are negative.    PFSH:        TOBACCO USE:  Social History     Tobacco Use   Smoking Status Former Smoker     Years: 10.00     Types: Cigarettes   Smokeless Tobacco Never Used   Tobacco Comment    Occasional smoking for a month at a time since quitting       VITALS:  Vitals:    03/02/20 0816   BP: 112/76   Patient Site: Left Arm   Patient Position: Sitting   Cuff Size: Adult Regular   Pulse: 71   SpO2: 98%   Weight: 171 lb 11.2 oz (77.9 kg)   Height: 5' (1.524 m)     Wt Readings from Last 3 Encounters:   03/02/20 171 lb 11.2 oz (77.9 kg)   02/20/20 170 lb 8 oz (77.3 kg)   10/30/19 173 lb (78.5 kg)     Body mass index is 33.53 kg/m .    PHYSICAL EXAM:  Constitutional:  Well appearing patient in no acute distress.  Vitals:  Per nursing notes.    HEENT:  Normocephalic, atraumatic.  Ears are clear bilaterally, with no fluid or redness, and landmarks visible.  Pupils are equal and reactive to light, extraocular muscles intact, visual fields are full.  Nose is normal, and oropharynx is clear without redness.    Neck is without lymphadenopathy.    Lungs:  Clear to auscultation bilaterally without wheezes, rales or rhonchi.   Cardiac:  Regular rate and rhythm without murmurs, rubs, or gallops.       MEDICATIONS:  Current Outpatient Medications   Medication Sig Dispense Refill     b complex vitamins tablet Take 1 tablet by mouth daily.       celecoxib (CELEBREX) 100 MG capsule Take 1 capsule (100 mg total) by mouth daily. Ok to take twice daily sparingly. 30 capsule 2     cholecalciferol, vitamin D3, (VITAMIN D3 ORAL) Take by mouth.       diclofenac sodium (VOLTAREN TOP) Apply topically.       furosemide (LASIX) 20 MG tablet Take 20 mg by mouth 2 (two) times a day.        GLUCOSAMINE HCL ORAL Take by mouth.       hydrOXYzine HCl (ATARAX) 25 MG tablet Take 0.5-1 tablets (12.5-25 mg total) by mouth every 8 (eight) hours as needed for anxiety. 30 tablet 0     multivit-minerals/ferrous gluc (MULTI-VALORIE ORAL) Take by mouth.       NEXIUM 40 mg capsule Take 1 capsule (40 mg total) by mouth daily before breakfast. 90 capsule 3     zolpidem (AMBIEN) 10 mg tablet Take 5 mg by mouth at bedtime as needed for sleep.       albuterol (PROAIR HFA;PROVENTIL HFA;VENTOLIN HFA) 90 mcg/actuation inhaler Inhale 2 puffs every 6 (six) hours as needed for wheezing. 18 g 2     methylPREDNISolone (MEDROL DOSEPACK) 4 mg tablet follow package directions 21 tablet 0     No current facility-administered medications for this visit.

## 2021-06-14 NOTE — TELEPHONE ENCOUNTER
I called and advised patient look into what is being offered through the state and [facility] and all of those resources as we're currently unable to help her.     Loyda Ferrara, A

## 2021-06-14 NOTE — PROGRESS NOTES
Assessment & Plan     Fatigue, unspecified type    We will do our fatigue work-up today and see if anything pops up on that work-up.  We talked about daily exercise and trying to eat better and sleep better and the sound like she really has a tough time to sleep.  She does already take the Ambien at night for sleep.  She not really interested in trying anything different for sleep at this point.  We did talk about trying to use the hydroxyzine at times as well which may have augmented her sleep somewhat.  We can follow-up with her the blood test results when they become available.      - HM1(CBC and Differential)  - Comprehensive Metabolic Panel  - Thyroid Stimulating Hormone (TSH)  - Iron and Transferrin Iron Binding Capacity  - Ferritin  - Vitamin B12  - Vitamin D, Total (25-Hydroxy)  - Erythrocyte Sedimentation Rate  - C-Reactive Protein  - Glycosylated Hemoglobin A1c    Exposure to 2019 novel coronavirus    She is asking for the Covid antibody test today so we can add that blood work and follow-up with her when the results of become available.      - COVID-19 Virus (Coronavirus) Antibody & Titer Reflex    Acquired hypothyroidism    As mentioned above, along with her fatigue we can also make sure that her thyroid level is at a good place.  She has been on medication in the past but has not been for a couple of years now, so it may be as simple as needed get her back on some thyroid medication.        Gastroesophageal reflux disease without esophagitis    We talked about using the East omeprazole that she has been doing at home, and continuing with trying to find out what foods may be bothering her.  She also has been taking some anti-inflammatories which may be making it a bit worse as well.        Insomnia, unspecified type    As above we did not talk about the insomnia and trying to talk about decreased exposure to blue light to turn off the television on the machines an hour or so before she goes to bed  "which may be beneficial.  Also talked about not turning her phone on to look at it in the middle of the night.    Abnormal finding of blood chemistry, unspecified     - Iron and Transferrin Iron Binding Capacity  - Ferritin  - Glycosylated Hemoglobin A1c    Body mass index (BMI) 34.0-34.9, adult     - Vitamin D, Total (25-Hydroxy)      30 minutes spent on the date of the encounter doing chart review, review of test results, interpretation of tests, patient visit and documentation          BMI:   Estimated body mass index is 34.64 kg/m  as calculated from the following:    Height as of this encounter: 4' 11.5\" (1.511 m).    Weight as of this encounter: 174 lb 6.4 oz (79.1 kg).         No follow-ups on file.    Nino Lenz MD  [facility]     Veronica Collado is 70 y.o. and presents to clinic today for the following health issues   HPI     Patient is here today with concerns about fatigue.  She states that she just does not have any energy to do anything during the day it has been going on for a year or so.  It seems to be getting a bit worse.  She states that she does already exercise on a fairly regular basis.  She tries to eat well also.  She has a very tough time sleeping.  She states that she has a hard time getting sleep as well as staying asleep.  This certainly may be part of the issue for her.  She uses Ambien at a 5 mg most nights and it does seem to help on some occasions but then again she has some nights where she just does not sleep no matter what she does or tries.  She does not really think that she has been using too many of the phones and iPad her TV on at night but acknowledges she probably can to get better with this as well.    She has multiple other somatic complaints today, mostly related to fatigue and difficulty sleeping.    Review of Systems        Objective    /78 (Patient Site: Left Arm, Patient Position: Sitting, Cuff Size: Adult " "Regular)   Pulse 71   Ht 4' 11.5\" (1.511 m)   Wt 174 lb 6.4 oz (79.1 kg)   LMP  (LMP Unknown)   SpO2 97%   BMI 34.64 kg/m    Body mass index is 34.64 kg/m .  Physical Exam    Well-appearing female no acute distress.  Vital signs as noted.  Chest clear to auscultation.  Heart regular rate and rhythm.  Abdomen is benign.  Joint exam shows no swelling or redness or warmth noted.              "

## 2021-06-14 NOTE — TELEPHONE ENCOUNTER
Reason for Call:  Other COVID VACCINE      Detailed comments: HER  GOT SEPSIS LAST THURS AND IS IN HOSPITAL NOW, SHE IS HIS CAREGIVER AND IS WONDERING IF YOU CAN DIRECT HER TO WHERE SHE CAN GET A VACCINE. [facility] IS ONLY SCHEDULING FOR 75YRS AND OLDER. SHE IS CONCERN FOR HIM FOR WHEN HE COMES HOME.     Phone Number Patient can be reached at: Home number on file [phone] (home)    Best Time: ANYTIME    Can we leave a detailed message on this number?: Yes    Call taken on 2/1/2021 at 1:24 PM by Audrey Moctezuma

## 2021-06-16 PROBLEM — R53.83 FATIGUE, UNSPECIFIED TYPE: Status: ACTIVE | Noted: 2019-01-14

## 2021-06-16 PROBLEM — M79.644 CHRONIC THUMB PAIN, BILATERAL: Status: ACTIVE | Noted: 2019-01-14

## 2021-06-16 PROBLEM — M79.645 CHRONIC THUMB PAIN, BILATERAL: Status: ACTIVE | Noted: 2019-01-14

## 2021-06-16 PROBLEM — K21.9 GASTROESOPHAGEAL REFLUX DISEASE WITHOUT ESOPHAGITIS: Status: ACTIVE | Noted: 2020-07-15

## 2021-06-16 PROBLEM — M15.0 PRIMARY OSTEOARTHRITIS INVOLVING MULTIPLE JOINTS: Status: ACTIVE | Noted: 2019-02-19

## 2021-06-16 PROBLEM — G47.00 INSOMNIA: Status: ACTIVE | Noted: 2021-01-26

## 2021-06-16 PROBLEM — R12 HEARTBURN: Status: ACTIVE | Noted: 2019-01-14

## 2021-06-16 PROBLEM — G89.29 CHRONIC THUMB PAIN, BILATERAL: Status: ACTIVE | Noted: 2019-01-14

## 2021-06-18 NOTE — PATIENT INSTRUCTIONS - HE
Patient Instructions by Mechelle Joseph PA-C at 4/20/2021  3:30 PM     Author: Mechelle Joseph PA-C Service: -- Author Type: Physician Assistant    Filed: 4/20/2021  4:51 PM Encounter Date: 4/20/2021 Status: Signed    : Mechelle Joseph PA-C (Physician Assistant)       Patient Education     Constipation (Adult)  Constipation means that you have bowel movements that are less frequent than usual. Stools often become very hard and difficult to pass.  Constipation is very common. At some point in life it affects almost everyone. Since everyone's bowel habits are different, what is constipation to one person may not be to another. Your healthcare provider may do tests to diagnose constipation. It depends on what he or she finds when evaluating you.    Symptoms of constipation include:    Abdominal pain    Bloating    Vomiting    Painful bowel movements    Itching, swelling, bleeding, or pain around the anus  Causes  Constipation can have many causes. These include:    Diet low in fiber    Too much dairy    Not drinking enough liquids    Lack of exercise or physical activity. This is especially true for older adults.    Changes in lifestyle or daily routine, including pregnancy, aging, work, and travel    Frequent use or misuse of laxatives    Ignoring the urge to have a bowel movement or delaying it until later    Medicines, such as certain prescription pain medicines, iron supplements, antacids, certain antidepressants, and calcium supplements    Diseases like irritable bowel syndrome, bowel obstructions, stroke, diabetes, thyroid disease, Parkinson disease, hemorrhoids, and colon cancer  Complications  Potential complications of constipation can include:    Hemorrhoids    Rectal bleeding from hemorrhoids or anal fissures (skin tears)    Hernias    Dependency on laxatives    Chronic constipation    Fecal impaction    Bowel obstruction or perforation  Home care  All treatment should be done after talking  with your healthcare provider. This is especially true if you have another medical problems, are taking prescription medicines, or are an older adult. Treatment most often involves lifestyle changes. You may also need medicines. Your healthcare provider will tell you which will work best for you. Follow the advice below to help avoid this problem in the future.  Lifestyle changes  These lifestyle changes can help prevent constipation:    Diet. Eat a high-fiber diet, with fresh fruit and vegetables, and reduce dairy intake, meats, and processed foods    Fluids. It's important to get enough fluids each day. Drink plenty of water when you eat more fiber. If you are on diet that limits the amount of fluid you can have, talk about this with your healthcare provider.    Regular exercise. Check with your healthcare provider first.  Medicines  Take any medicines as directed. Some laxatives are safe to use only every now and then. Others can be taken on a regular basis. Talk with your doctor or pharmacist if you have questions.  Prescription pain medicines can cause constipation. If you are taking this kind of medicine, ask your healthcare provider if you should also take a stool softener.  Medicines you may take to treat constipation include:    Fiber supplements    Stool softeners    Laxatives    Enemas    Rectal suppositories  Follow-up care  Follow up with your healthcare provider if symptoms don't get better in the next few days. You may need to have more tests or see a specialist.  Call 911  Call 911 if any of these occur:    Trouble breathing    Stiff, rigid abdomen that is severely painful to touch    Confusion    Fainting or loss of consciousness    Rapid heart rate    Chest pain  When to seek medical advice  Call your healthcare provider right away if any of these occur:    Fever of 100.4 F (38 C) or higher, or as directed by your healthcare provider    Failure to resume normal bowel movements    Pain in your  abdomen or back gets worse    Nausea or vomiting    Swelling in your abdomen    Blood in the stool    Black, tarry stool    Involuntary weight loss    Weakness  Date Last Reviewed: 12/30/2015 2000-2017 [employer]. [address] [place] [address]. All rights reserved. This information is not intended as a substitute for professional medical care. Always follow your healthcare professional's instructions.         Patient Education     What Is Appendicitis?    Maybe your side hurt so much that you called your healthcare provider. Or maybe you went straight to the hospital emergency room. If the symptoms came on quickly, you may have appendicitis. This is an infection of the appendix. Surgery can remove the infection and relieve your symptoms. Read on to learn more.  Your appendix  The appendix is a hollow pouch about the size of your little finger. It hangs off the colon (large intestine). The purpose of the appendix is unclear. But if it becomes blocked, it may become infected.  Symptoms of appendicitis  Symptoms tend to appear quickly, often over 1 to 2 days. Symptoms can include:    Pain that starts in the center of your belly and moves to your lower right side    Increased pain and pressure on your side when you walk    Vomiting, nausea, or decreased appetite    Fever or fatigue    Either diarrhea or constipation  How surgery helps  Medicine cant cure appendicitis. Surgery is needed to remove an infected appendix. This is called an appendectomy. This is a very common procedure. Removing the appendix should not affect your long-term health. Its best to remove the appendix before it bursts. If an infected or burst appendix is not removed, it can cause severe health problems.  Date Last Reviewed: 4/1/2019 2000-2019 [employer]. [address] [place] [address]. All rights reserved. This information is not intended as a substitute for professional medical care.  Always follow your healthcare professional's instructions.

## 2021-06-18 NOTE — LETTER
Letter by Hank Vilchis MD at      Author: Hank Vilchis MD Service: -- Author Type: --    Filed:  Encounter Date: 1/15/2019 Status: (Other)       Veronica Collado  [address] [place] [address]  [facility] [id number]             January 15, 2019         Dear Ms. Collado,    Below are the results from your recent visit:    Resulted Orders   Comprehensive Metabolic Panel   Result Value Ref Range    Sodium 140 136 - 145 mmol/L    Potassium 4.7 3.5 - 5.0 mmol/L    Chloride 103 98 - 107 mmol/L    CO2 26 22 - 31 mmol/L    Anion Gap, Calculation 11 5 - 18 mmol/L    Glucose 91 70 - 125 mg/dL    BUN 19 8 - 22 mg/dL    Creatinine 0.73 0.60 - 1.10 mg/dL    GFR MDRD Af Amer >60 >60 mL/min/1.73m2    GFR MDRD Non Af Amer >60 >60 mL/min/1.73m2    Bilirubin, Total 0.6 0.0 - 1.0 mg/dL    Calcium 10.3 8.5 - 10.5 mg/dL    Protein, Total 6.9 6.0 - 8.0 g/dL    Albumin 4.0 3.5 - 5.0 g/dL    Alkaline Phosphatase 87 45 - 120 U/L    AST 24 0 - 40 U/L    ALT 19 0 - 45 U/L    Narrative    Fasting Glucose reference range is 70-99 mg/dL per  American Diabetes Association (ADA) guidelines.   HM2(CBC w/o Differential)   Result Value Ref Range    WBC 8.0 4.0 - 11.0 thou/uL    RBC 4.40 3.80 - 5.40 mill/uL    Hemoglobin 14.0 12.0 - 16.0 g/dL    Hematocrit 42.3 35.0 - 47.0 %    MCV 96 80 - 100 fL    MCH 31.9 27.0 - 34.0 pg    MCHC 33.2 32.0 - 36.0 g/dL    RDW 11.6 11.0 - 14.5 %    Platelets 305 140 - 440 thou/uL    MPV 6.7 (L) 7.0 - 10.0 fL   Thyroid Stimulating Hormone (TSH)   Result Value Ref Range    TSH 1.61 0.30 - 5.00 uIU/mL   Erythrocyte Sedimentation Rate   Result Value Ref Range    Sed Rate 7 0 - 20 mm/hr   Rheumatoid Factor Quant   Result Value Ref Range    Rheumatoid Factor Quantitative <15.0 0 - 30 IU/mL       Kidney and liver tests are normal.  Blood sugar is normal.    Hemoglobin and blood counts are normal.    Thyroid test is normal.    Sed rate and rheumatoid factor are normal, no evidence for rheumatoid type  arthritis.    Reevaluate on February 5 as planned.    Please call with questions or contact us using [employer].    Sincerely,        Electronically signed by Hank Vilchis MD

## 2021-06-19 NOTE — LETTER
Letter by Raymundo Muñoz MD at      Author: Raymundo Muñoz MD Service: -- Author Type: --    Filed:  Encounter Date: 8/21/2019 Status: (Other)         Veronica L Halley  [address] [place] [address]  [facility] [address]             August 21, 2019         Dear Ms. Collado,    Our records show that you are due for a colonoscopy.  We do want to inform you that there are a couple of other options besides the traditional colonoscopy that we offer.  If you would like to do the traditional colonoscopy, please contact a [facility] near you according to the card enclosed.  If you would like to do one of the other options available to you, please let you primary doctor know and they will get that ordered.  Enclosed is some information on the other options for you to read over.  If you have had any of these done at another facility, please arrange for us to receive those records so we can update your chart.    Please call with questions or contact us using [employer].    Sincerely,        Electronically signed by Raymundo Muñoz MD

## 2021-06-19 NOTE — LETTER
Letter by Raymundo Muñoz MD at      Author: Raymundo Muñoz MD Service: -- Author Type: --    Filed:  Encounter Date: 3/19/2019 Status: (Other)         Veronica Collado  [address] [place] [id number]             March 19, 2019         Dear Ms. Westchase,    Below are the results from your recent visit:    Resulted Orders   CCP Antibodies   Result Value Ref Range    CCP IgG Antibodies <0.5 <=4.9 U/mL   Vitamin D, Total (25-Hydroxy)   Result Value Ref Range    Vitamin D, Total (25-Hydroxy) 47.2 30.0 - 80.0 ng/mL    Narrative    Deficiency <10.0 ng/mL  Insufficiency 10.0-29.9 ng/mL  Sufficiency 30.0-80.0 ng/mL  Toxicity (possible) >100.0 ng/mL   Urinalysis-UC if Indicated   Result Value Ref Range    Color, UA Yellow Colorless, Yellow, Straw, Light Yellow    Clarity, UA Clear Clear    Glucose, UA Negative Negative    Bilirubin, UA Negative Negative    Ketones, UA Negative Negative    Specific Gravity, UA 1.015 1.005 - 1.030    Blood, UA Trace (!) Negative    pH, UA 6.5 5.0 - 8.0    Protein, UA Negative Negative mg/dL    Urobilinogen, UA 0.2 E.U./dL 0.2 E.U./dL, 1.0 E.U./dL    Nitrite, UA Negative Negative    Leukocytes, UA Negative Negative    Bacteria, UA None Seen None Seen hpf    RBC, UA 0-2 None Seen, 0-2 hpf    WBC, UA 0-5 None Seen, 0-5 hpf    Squam Epithel, UA 0-5 None Seen, 0-5 lpf    Narrative    UC not indicated   Glycosylated Hemoglobin A1c   Result Value Ref Range    Hemoglobin A1c 5.2 3.5 - 6.0 %       As you can see all your labs including tests for vitamin D, diabetes screening and rheumatoid arthritis tests are all normal. I am anxious to see what the sleep medicine consultation will show.     Please call with questions or contact us using [employer].    Sincerely,        Electronically signed by Raymundo Muñoz MD

## 2021-06-19 NOTE — LETTER
Letter by Raymundo Muñoz MD at      Author: Raymundo Muñoz MD Service: -- Author Type: --    Filed:  Encounter Date: 3/18/2019 Status: (Other)         [facility] INTERNAL MEDICINE  03/18/19    Patient: Veronica Collado  YOB: 1950  Medical Record Number: [id number]  CSN: [id number]                                                                              Non-opioid Controlled Substance Agreement    I understand that my care provider has prescribed a controlled substance to help manage my condition(s). I am taking this medicine to help me function or work. I know this is strong medicine, and that it can cause serious side effects. Controlled substances can be sedating, addicting and may cause a dependency on the drug. They can affect my ability to drive or think, and cause depression. They need to be taken exactly as prescribed. Combining controlled substances with certain medicines or chemicals (such as cocaine, sedatives and tranquilizers, sleeping pills, meth) can be dangerous or even fatal. Also, if I stop controlled substances suddenly, I may have severe withdrawal symptoms.  If not helpful, I may be asked to stop them.    The risks, benefits, and side effects of these medicine(s) were explained to me. I agree that:    1. I will take part in other treatments as advised by my care team. This may be psychiatry or counseling, physical therapy, behavioral therapy, group treatment or a referral to a pain clinic. I will reduce or stop my medicine when my care team tells me to do so.  2. I will take my medicines as prescribed. I will not change the dose or schedule unless my care team tells me to. There will be no refills if I run out early.  I may be contactedwithout warning and asked to complete a urine drug test or pill count at any time.   3. I will keep all my appointments, and understand this is part of the monitoring of controlled substances. My care team may require an office visit for  EVERY controlled substance refill. If I miss appointments or dont follow instructions, my care team may stop my medicine.  4. I will not ask other providers to prescribe controlled substances, and I will not accept controlled substances from other people. If I need another prescribed controlled substance for a new reason, I will tell my care team within 1 business day.  5. I will use one pharmacy to fill all of my controlled substance prescriptions, and it is up to me to make sure that I do not run out of my medicines on weekends or holidays. If my care team is willing to refill my controlled substance prescription without a visit, I must request refills only during office hours, refills may take up to 3 days to process, and it may take up to 5 to 7 days for my medicine to be mailed and ready at my pharmacy. Prescriptions will not be mailed anywhere except my pharmacy.    6. I am responsible for my prescriptions. If the medicine/prescription is lost or stolen, it will not be replaced. I also agree not to share controlled substance medicines with anyone.          [facility] INTERNAL MEDICINE  03/18/19  Patient:  Veronica Collado  YOB: 1950  Medical Record Number: [id number]  CSN: [id number]    7. I agree to not use ANY illegal or recreational drugs. This includes marijuana, cocaine, bath salts or other drugs. I agree not to use alcohol unless my care team says I may. I agree to give urine samples whenever asked. If I dont give a urine sample, the care team may stop my medicine.    8. If I enroll in the [place] [facility] program, I will tell my care team. I will also sign an agreement to share my medical records with my care team.    9. I will bring in my list of medicines (or my medicine bottles) each time I come to the clinic.   10. I will tell my care team right away if I become pregnant or have a new medical problem treated outside of my regular clinic.  11. I understand that  this medicine can affect my thinking and judgment. It may be unsafe for me to drive, use machinery and do dangerous tasks. I will not do any of these things until I know how the medicine affects me. If my dose changes, I will wait to see how it affects me. I will contact my care team if I have concerns about medicine side effects.    I understand that if I do not follow any of the conditions above, my prescriptions or treatment may be stopped.      I agree that my provider, clinic care team, and pharmacy may work with any city, state or federal law enforcement agency that investigates the misuse, sale, or other diversion of my controlled medicine. I will allow my provider to discuss my care with or share a copy of this agreement with any other treating provider, pharmacy or emergency room where I receive care. I agree to give up (waive) any right of privacy or confidentiality with respect to these consents.   I have read this agreement and have asked questions about anything I did not understand.    ___________________________________________________________________________  Patient signature - Date/Time  -Veronica Collado                                      ___________________________________________________________________________  Witness signature                                                                    ___________________________________________________________________________  Provider signature- Raymundo Muñoz MD

## 2021-06-20 ENCOUNTER — HEALTH MAINTENANCE LETTER (OUTPATIENT)
Age: 71
End: 2021-06-20

## 2021-06-20 NOTE — LETTER
Letter by Mary Goode at      Author: Mary Goode Service: -- Author Type: --    Filed:  Encounter Date: 1/8/2020 Status: Signed         Veronica Collado  [address] [place] [address] [address]  [place] [address]           01/08/20       Dear Veronica:      At [facility], we care about your health and well-being.  Your primary care provider is committed to ensuring you receive high quality care and has chosen a network of specialists to assist in providing that care.  Recently Dr. Russo referred you to a rheumatologist for specialty care.      It is important to your overall health to follow through with the referral from your care provider.  Please call me at [phone] at your earliest convenience for assistance in scheduling an appointment with the recommended specialist.  If you have already scheduled an appointment, please disregard this notice.  Thank you for choosing the [facility] for your healthcare needs.        Sincerely,        Electronically signed by Mary Goode         [facility]

## 2021-06-21 NOTE — LETTER
Letter by Nino Lenz MD at      Author: Nino Lenz MD Service: -- Author Type: --    Filed:  Encounter Date: 1/28/2021 Status: (Other)       Parent/guardian of Veronica Collado  [address] [place] [address]  [place] [address]             January 28, 2021         Dear Veronica Collado,    Below are the results from Veronica's recent visit:    Resulted Orders   Comprehensive Metabolic Panel   Result Value Ref Range    Sodium 141 136 - 145 mmol/L    Potassium 4.2 3.5 - 5.0 mmol/L    Chloride 103 98 - 107 mmol/L    CO2 29 22 - 31 mmol/L    Anion Gap, Calculation 9 5 - 18 mmol/L    Glucose 91 70 - 125 mg/dL    BUN 19 8 - 22 mg/dL    Creatinine 0.66 0.60 - 1.10 mg/dL    GFR MDRD Af Amer >60 >60 mL/min/1.73m2    GFR MDRD Non Af Amer >60 >60 mL/min/1.73m2    Bilirubin, Total 0.4 0.0 - 1.0 mg/dL    Calcium 9.8 8.5 - 10.5 mg/dL    Protein, Total 6.8 6.0 - 8.0 g/dL    Albumin 3.9 3.5 - 5.0 g/dL    Alkaline Phosphatase 86 45 - 120 U/L    AST 23 0 - 40 U/L    ALT 18 0 - 45 U/L    Narrative    Fasting Glucose reference range is 70-99 mg/dL per  American Diabetes Association (ADA) guidelines.   Thyroid Stimulating Hormone (TSH)   Result Value Ref Range    TSH 2.29 0.30 - 5.00 uIU/mL   Iron and Transferrin Iron Binding Capacity   Result Value Ref Range    Iron 94 42 - 175 ug/dL    Transferrin 234 212 - 360 mg/dL    Transferrin Saturation, Calculated 32 20 - 50 %    Transferrin IBC, Calculated 293 (L) 313 - 563 ug/dL   Ferritin   Result Value Ref Range    Ferritin 136 (H) 10 - 130 ng/mL   Vitamin B12   Result Value Ref Range    Vitamin B-12 1,576 (H) 213 - 816 pg/mL   Vitamin D, Total (25-Hydroxy)   Result Value Ref Range    Vitamin D, Total (25-Hydroxy) 53.7 30.0 - 80.0 ng/mL    Narrative    Deficiency <10.0 ng/mL  Insufficiency 10.0-29.9 ng/mL  Sufficiency 30.0-80.0 ng/mL  Toxicity (possible) >100.0 ng/mL   Erythrocyte Sedimentation Rate   Result Value Ref Range    Sed Rate 11 0 - 20 mm/hr   C-Reactive Protein   Result  Value Ref Range    CRP 0.3 0.0 - 0.8 mg/dL   Glycosylated Hemoglobin A1c   Result Value Ref Range    Hemoglobin A1c 5.3 <=5.6 %   HM1 (CBC with Diff)   Result Value Ref Range    WBC 6.3 4.0 - 11.0 thou/uL    RBC 4.05 3.80 - 5.40 mill/uL    Hemoglobin 13.2 12.0 - 16.0 g/dL    Hematocrit 39.2 35.0 - 47.0 %    MCV 97 80 - 100 fL    MCH 32.7 27.0 - 34.0 pg    MCHC 33.8 32.0 - 36.0 g/dL    RDW 11.1 11.0 - 14.5 %    Platelets 273 140 - 440 thou/uL    MPV 7.2 7.0 - 10.0 fL    Neutrophils % 65 50 - 70 %    Lymphocytes % 24 20 - 40 %    Monocytes % 8 2 - 10 %    Eosinophils % 3 0 - 6 %    Basophils % 0 0 - 2 %    Neutrophils Absolute 4.1 2.0 - 7.7 thou/uL    Lymphocytes Absolute 1.5 0.8 - 4.4 thou/uL    Monocytes Absolute 0.5 0.0 - 0.9 thou/uL    Eosinophils Absolute 0.2 0.0 - 0.4 thou/uL    Basophils Absolute 0.0 0.0 - 0.2 thou/uL        All the tests are pretty normal.      Vit D is great, and your b12 is actually very good.      Thyroid test is normal.      Inflammatory tests are normal.      Your complete metabolic panel is good.     Your blood sugars are normal, and your kidney function tests and liver function tests are in a normal  range.      Blood counts are normal.     Please call with questions or contact us using [employer].    Sincerely,        Electronically signed by Nino Lenz MD

## 2021-06-21 NOTE — LETTER
Letter by Nino Lenz MD at      Author: Nino Lenz MD Service: -- Author Type: --    Filed:  Encounter Date: 1/28/2021 Status: (Other)         Veronica Collado  [address] [place] [id number]   January 28, 2021     Dear Ms. Collado,    Below are the results from your recent visit:  Resulted Orders   Comprehensive Metabolic Panel   Result Value Ref Range    Sodium 141 136 - 145 mmol/L    Potassium 4.2 3.5 - 5.0 mmol/L    Chloride 103 98 - 107 mmol/L    CO2 29 22 - 31 mmol/L    Anion Gap, Calculation 9 5 - 18 mmol/L    Glucose 91 70 - 125 mg/dL    BUN 19 8 - 22 mg/dL    Creatinine 0.66 0.60 - 1.10 mg/dL    GFR MDRD Af Amer >60 >60 mL/min/1.73m2    GFR MDRD Non Af Amer >60 >60 mL/min/1.73m2    Bilirubin, Total 0.4 0.0 - 1.0 mg/dL    Calcium 9.8 8.5 - 10.5 mg/dL    Protein, Total 6.8 6.0 - 8.0 g/dL    Albumin 3.9 3.5 - 5.0 g/dL    Alkaline Phosphatase 86 45 - 120 U/L    AST 23 0 - 40 U/L    ALT 18 0 - 45 U/L    Narrative    Fasting Glucose reference range is 70-99 mg/dL per  American Diabetes Association (ADA) guidelines.   Thyroid Stimulating Hormone (TSH)   Result Value Ref Range    TSH 2.29 0.30 - 5.00 uIU/mL   Iron and Transferrin Iron Binding Capacity   Result Value Ref Range    Iron 94 42 - 175 ug/dL    Transferrin 234 212 - 360 mg/dL    Transferrin Saturation, Calculated 32 20 - 50 %    Transferrin IBC, Calculated 293 (L) 313 - 563 ug/dL   Ferritin   Result Value Ref Range    Ferritin 136 (H) 10 - 130 ng/mL   Vitamin B12   Result Value Ref Range    Vitamin B-12 1,576 (H) 213 - 816 pg/mL   Vitamin D, Total (25-Hydroxy)   Result Value Ref Range    Vitamin D, Total (25-Hydroxy) 53.7 30.0 - 80.0 ng/mL    Narrative    Deficiency <10.0 ng/mL  Insufficiency 10.0-29.9 ng/mL  Sufficiency 30.0-80.0 ng/mL  Toxicity (possible) >100.0 ng/mL   Erythrocyte Sedimentation Rate   Result Value Ref Range    Sed Rate 11 0 - 20 mm/hr   C-Reactive Protein   Result Value Ref Range    CRP 0.3 0.0 - 0.8 mg/dL    Glycosylated Hemoglobin A1c   Result Value Ref Range    Hemoglobin A1c 5.3 <=5.6 %   HM1 (CBC with Diff)   Result Value Ref Range    WBC 6.3 4.0 - 11.0 thou/uL    RBC 4.05 3.80 - 5.40 mill/uL    Hemoglobin 13.2 12.0 - 16.0 g/dL    Hematocrit 39.2 35.0 - 47.0 %    MCV 97 80 - 100 fL    MCH 32.7 27.0 - 34.0 pg    MCHC 33.8 32.0 - 36.0 g/dL    RDW 11.1 11.0 - 14.5 %    Platelets 273 140 - 440 thou/uL    MPV 7.2 7.0 - 10.0 fL    Neutrophils % 65 50 - 70 %    Lymphocytes % 24 20 - 40 %    Monocytes % 8 2 - 10 %    Eosinophils % 3 0 - 6 %    Basophils % 0 0 - 2 %    Neutrophils Absolute 4.1 2.0 - 7.7 thou/uL    Lymphocytes Absolute 1.5 0.8 - 4.4 thou/uL    Monocytes Absolute 0.5 0.0 - 0.9 thou/uL    Eosinophils Absolute 0.2 0.0 - 0.4 thou/uL    Basophils Absolute 0.0 0.0 - 0.2 thou/uL   All the tests are pretty normal.   Vit D is great, and your b12 is actually very good.   Thyroid test is normal.   Inflammatory tests are normal.   Your complete metabolic panel is good.  Your blood sugars are normal, and your kidney function tests and liver function tests are in a normal range.   Blood counts are normal.     Please call with questions or contact us using [employer].    Sincerely,        Electronically signed by Nino Lenz MD

## 2021-06-23 NOTE — PATIENT INSTRUCTIONS - HE
Lab work today.  Results will be mailed to you.    See  for referral to the rheumatologist.    See me for follow-up in clinic in 3-4 weeks.    You may wish to consider a sleep study to evaluate for sleep apnea.    You can buy brand name Nexium over-the-counter, if [facility] cannot [facility].

## 2021-06-23 NOTE — PROGRESS NOTES
[facility] clinic Follow Up Note    Veronica Collado   68 y.o. female    Date of Visit: 1/14/2019    Chief Complaint   Patient presents with     Establish Care     Subjective history as per patient, no old records were available.  Veronica is a new patient here to establish care.  Moved from [place] to be closer to her daughter.    Her  is on disability with peripheral lateral sclerosis condition, [facility]    [facility] [place], although she is not service connected.    Patient is here complaining of significant increased fatigue over the past month.    She is moderately overweight.  Does not sleep well, tends to be restless.  Possible snoring, she has not had a sleep study evaluation in the past.    Her  has sleep apnea.    Patient is having severe daytime sleepiness, feels quite sleepy in the evening difficulty staying awake.    He is restless at night and wakes up in the morning not feeling well rested at all, somewhat foggy feeling.    She has had some mild intermittent headaches, not clearly morning headaches.  No headache currently.    She has chronic reflux, has been somewhat worse recently.  She states that the esomeprazole does not work for her, needs to use brand name Nexium.  No dysphagia.  No blood in stool or melena.  No change in bowels or constipation.    Patient states she has had a scope of her stomach in the past but unknown if they look for Rod's or other abnormality such as that.    She states she had a colonoscopy in 2014, no history of polyp and she states that was negative and a 10-year follow-up recommended.  No family history of colon cancer.    Patient has had bilateral thumb arthritis symptoms with some increasing tenosynovitis symptoms of her hand over the past 2-3 years.  She has had previous cortisone shot in her thumbs about 2-3 years ago.  It only gave temporary benefit.    She has not had significant arthritis  elsewhere.    Over the past couple of months she complains of a mild increased achy feeling everywhere.    No skin changes.    No new cough or respiratory symptoms.  No history of asthma.    She quit smoking over 40 years ago, but does smoke intermittently more recently.    She has chronic insomnia and has used Ambien intermittently in the past.  No history of depression or psychiatric treatment.    She does walk fairly regularly.  No exertional symptoms.  No chest pain or increasing shortness of breath or palpitations.    She has not had a previous heart workup.    Previous right knee arthroscopy in 2011, no complaints at this time    She generally drinks one alcohol drink a day.    Her mother has diabetes.  Patient had gestational diabetes.  Patient denies significant polyuria or polydipsia.        PMHx:  No past medical history on file.  PSHx:    Past Surgical History:   Procedure Laterality Date     CATARACT EXTRACTION, BILATERAL Bilateral 2009     KNEE CARTILAGE SURGERY Right 2011     Immunizations:   There is no immunization history on file for this patient.    ROS A comprehensive review of systems was performed and was otherwise negative    Medications, allergies, and problem list were reviewed and updated    Exam  /80 (Patient Site: Right Arm, Patient Position: Sitting, Cuff Size: Adult Large)   Pulse 88   Wt 167 lb (75.8 kg)   BMI 32.61 kg/m    Moderately overweight.  Alert and oriented x3.  Pupils and irises equal and reactive.  Extraocular muscles intact.  No jaundice or conjunctivitis.  Pharynx appears mildly crowded, no significant pharyngitis.  No leukoplakia or oral lesions.  No cervical or supraclavicular adenopathy.  No JVD and no carotid bruits.  No thyromegaly or nodularity.  Lungs are clear to auscultation with good respiratory excursion.  Heart is regular with no murmur rub or gallop.  Abdomen is moderately overweight, nontender, no hepatosplenomegaly or pulsatile mass.  No ankle edema.   Gait within normal limits.    Hand does show some mild Harlan arthritic changes at the base of the thumb.  There is no rheumatic arthritis type changes to the knuckles.  No active inflammation or erythema.    Skin is normal to inspection and palpation    Assessment/Plan  1. Fatigue, unspecified type  I suspect sleep apnea.  Patient is somewhat resistant to this diagnosis and did not want a referral to a sleep clinic.    I will follow-up with her soon, and see if she is willing to go to the sleep clinic at that time.    She can work on weight loss and regular exercise.    She does have Ambien from previous physician, I did give her warnings on fall risk with that, as well as addiction risk and sedation risk.      - Ambulatory referral to Rheumatology  - Comprehensive Metabolic Panel  - HM2(CBC w/o Differential)  - Thyroid Stimulating Hormone (TSH)    2. Chronic thumb pain, bilateral  I suspect chronic thumb DJD with some tenosynovitis.    If significant worsening, she could consider another cortisone shot.    She wanted a referral to rheumatology, and she was referred.    She could use NSAIDs as needed, if they do not worsen her GI symptoms.  - Ambulatory referral to Rheumatology  - Erythrocyte Sedimentation Rate  - Rheumatoid Factor Quant    3. Heartburn  She was given a prescription for brand name Nexium, unclear if she can [facility], but I told her I did not prescribe [facility] the [facility].    She was told she could buy that over-the-counter if needed.  - NEXIUM 40 mg capsule; Take 1 capsule (40 mg total) by mouth daily before breakfast.  Dispense: 90 capsule; Refill: 3    4. Family history of diabetes mellitus in mother  Check blood sugar today.  Work on diet and exercise.    Plan 10-year colonoscopy screening in 2024.    She was given a card and I suggested she consider scheduling her mammogram.    She is always had normal Pap smears, still has her GYN organs in.  No vaginal bleeding or spotting, no  urinary symptoms.  Previous Pap smears have been normal, no further Paps planned with age over 65    Her blood pressure is normal, does not appear to be a high cardiovascular risk.  I would plan to have her check a cholesterol fasting in the future.    Return in about 4 weeks (around 2/11/2019) for Recheck.   Patient Instructions   Lab work today.  Results will be mailed to you.    See  for referral to the rheumatologist.    See me for follow-up in clinic in 3-4 weeks.    You may wish to consider a sleep study to evaluate for sleep apnea.    You can buy brand name Nexium over-the-counter, if you cannot get [facility].    Hank Vilchis MD        Current Outpatient Medications   Medication Sig Dispense Refill     zolpidem (AMBIEN) 10 mg tablet Take 5 mg by mouth at bedtime as needed for sleep.       esomeprazole (NEXIUM) 40 MG capsule Take 40 mg by mouth daily before breakfast.       NEXIUM 40 mg capsule Take 1 capsule (40 mg total) by mouth daily before breakfast. 90 capsule 3     No current facility-administered medications for this visit.      No Known Allergies  Social History     Tobacco Use     Smoking status: Former Smoker     Years: 10.00     Types: Cigarettes     Smokeless tobacco: Never Used     Tobacco comment: Occasional smoking for a month at a time since quitting   Substance Use Topics     Alcohol use: Yes     Frequency: 4 or more times a week     Drug use: No

## 2021-06-24 NOTE — PROGRESS NOTES
ASSESSMENT AND PLAN:  Veronica Collado 68 y.o. female is seen here on 02/19/19 for evaluation of bilateral painful hands.  She has ample evidence of osteoarthritis.  This is discussed with her.  Management principles were outlined.  She already has braces.  She is somewhat reluctant to consider surgery or intra-articular corticosteroid injections.  I have asked her not to take ibuprofen especially given that she already has long-standing chronic heartburn.  We discussed other options.  We discussed acetaminophen sustained-release.  We also discussed duloxetine literature on which is provided for her review.  She would let us know how she wants to proceed.  She will come back here on as-needed basis.  Diagnoses and all orders for this visit:    Chronic thumb pain, bilateral    Primary osteoarthritis involving multiple joints      HISTORY OF PRESENTING ILLNESS:  Veronica Collado, 68 y.o., female is here for evaluation of painful hands this is, bilateral.  Going on for the past couple of years..  Joints affected include 1st cmc bilat. This has gone on for 2 years ago. Pain is described as sharp. It is every day and when active.  His symptoms are severe. The symptoms are gradually worsening. Symptoms include pain, tenderness to touch, swelling.  Treatment to date has been without significant relief.  She recalls having had corticosteroid injections a year or so ago which lasted to her disappointment only 2 months.  She recalls having had corticosteroid injection in her knee which lasted 2 years.  She has had a meniscus tear and surgery in her right knee.  In the remote past she had fallen off the horse with her foot still in the stirrup and injured her hip.  That intermittently troubles her.  She has noted difficulty opening bottles jars.  Her  has a debilitating neurological disorder who cannot open the charts for example for her.  She also would like to do many other things in life amongst her bucket list.   She is a hiker.  She wants to take her 5 grandchildren to [facility] before hiking.  She has noted no pain in other joints.  She has occasional neck and back pain.  She reports fatigue.  She reports frustration with her stress that her 's illness is created over the past several years and now.  She is an online educator.  She is not a smoker.  There is no personal or family history of psoriasis ulcerative colitis Crohn's disease there is no family history of rheumatoid arthritis or lupus.Further historical information and ADL limitations as noted in the multidimensional health assessment questionnaire attached in the EMR. Rest of the 13 system ROS is negative.     ALLERGIES:Patient has no known allergies.    PAST MEDICAL/ACTIVE PROBLEMS/MEDICATION/ FAMILY HISTORY/SOCIAL DATA:  The patient has a family history of  No past medical history on file.  Social History     Tobacco Use   Smoking Status Former Smoker     Years: 10.00     Types: Cigarettes   Smokeless Tobacco Never Used   Tobacco Comment    Occasional smoking for a month at a time since quitting     Patient Active Problem List   Diagnosis     Family history of diabetes mellitus in mother     Chronic thumb pain, bilateral     Fatigue, unspecified type     Heartburn     Current Outpatient Medications   Medication Sig Dispense Refill     b complex vitamins tablet Take 1 tablet by mouth daily.       cholecalciferol, vitamin D3, (VITAMIN D3 ORAL) Take by mouth.       diclofenac sodium (VOLTAREN TOP) Apply topically.       esomeprazole (NEXIUM) 40 MG capsule Take 40 mg by mouth daily before breakfast.       furosemide (LASIX) 20 MG tablet Take 20 mg by mouth 2 (two) times a day.       GLUCOSAMINE HCL ORAL Take by mouth.       multivit-minerals/ferrous gluc (MULTI-VALORIE ORAL) Take by mouth.       NEXIUM 40 mg capsule Take 1 capsule (40 mg total) by mouth daily before breakfast. 90 capsule 3     zolpidem (AMBIEN) 10 mg tablet Take 5 mg by mouth at bedtime as  needed for sleep.       No current facility-administered medications for this visit.        COMPREHENSIVE EXAMINATION:  Vitals:    02/19/19 1523   BP: 120/84   Patient Site: Right Arm   Patient Position: Sitting   Cuff Size: Adult Regular   Pulse: 80   Weight: 167 lb (75.8 kg)   Height: 5' (1.524 m)     A well appearing alert oriented female. Vital data as noted above. Her eyes without inflammation/scleromalacia. ENTwithout oral mucositis, thrush, nasal deformity, external ear redness, deformity. Her neck is without lymphadenopathy and supple. Lungs normal sounds, no pleural rub. Heart auscultation normal rate, rhythm; no pericardial rub and murmurs. Abdomen soft, non tender, no organomegaly. Skin examined for heliotrope, malar area eruption, lupus pernio, periungual erythema, sclerodactyly, papules, erythema nodosum, purpura, nail pitting, onycholysis, and obvious psoriasis lesion. Neurological examination shows normal alertness, speech, facial symmetry, tone and power in upper and lower extremities, Tinel's and Phalen's at wrist and gait. The joint examination is performed for swelling, tenderness, warmth, erythema, and range of motion in the following joints: DIPs, PIPs, MCPs, wrists, first CMC's, elbows, shoulders, hips, knees, ankles, feet; spine for range of motion and paraspinal muscles for tenderness. The salient normal / abnormal findings are appended.  Marked tenderness at the first CMC with minimal grinding this.  She has no synovitis of any of the palpable joints of upper or lower extremities.  She has marked tenderness of the joint line of the knees bilaterally worse on the right side.  She is mild tenderness of the trochanteric area.  She has no dactylitis.    LAB / IMAGING DATA:  ALT   Date Value Ref Range Status   01/14/2019 19 0 - 45 U/L Final     Albumin   Date Value Ref Range Status   01/14/2019 4.0 3.5 - 5.0 g/dL Final     Creatinine   Date Value Ref Range Status   01/14/2019 0.73 0.60 - 1.10  mg/dL Final       WBC   Date Value Ref Range Status   01/14/2019 8.0 4.0 - 11.0 thou/uL Final     Hemoglobin   Date Value Ref Range Status   01/14/2019 14.0 12.0 - 16.0 g/dL Final     Platelets   Date Value Ref Range Status   01/14/2019 305 140 - 440 thou/uL Final       Lab Results   Component Value Date    RF <15.0 01/14/2019    SEDRATE 7 01/14/2019

## 2021-06-25 NOTE — PROGRESS NOTES
ASSESSMENT:  1. Chronic fatigue  I believe this is most likely due to her sleep problems.  She describes daytime somnolence as well as extreme fatigue to the point where she is falling asleep at her desk.  Difficult to know whether this is obstructive sleep apnea versus sleep latency or other sleep rhythm disturbances.  After a full discussion of my concerns she is willing to go to see sleep medicine for consultation and possible sleep study.  In the meantime we will check other metabolic disorders.  She is very convinced that this could be rheumatoid arthritis although she has seen rheumatology recently and they thought she had osteoarthritis instead.  - Vitamin D, Total (25-Hydroxy)  - Ambulatory referral to Sleep Medicine  - Urinalysis-UC if Indicated  - Glycosylated Hemoglobin A1c    2. Polyarthralgia  Her labs and she had a normal ESR as well as no factor.  She had a recent evaluation by rheumatology and they felt she had osteoarthritis.  She has not consider treatment options here because she is still convinced that she might have rheumatoid arthritis.  - CCP Antibodies for completeness sake.  I explained to her that her osteoarthritis could be very painful just as rheumatoid arthritis are but that they are very different pathophysiologically.  Treatment is somewhat very different as well.    3. 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  - Vitamin D, Total (25-Hydroxy)    4. Visit for screening mammogram  - Mammo Screening Bilateral; Future    5. Screen for colon cancer  She believes she has had this done in the [place].  We will get an VERO for her colon cancer test.    6. Abnormal urine odor  - Urinalysis-UC if Indicated    7. History of gestational diabetes  - Glycosylated Hemoglobin A1c    8. Body mass index (BMI) of 34.0-34.9 in adult   Discussed working slowly on adding some physical activity to her day  as well as changing her diet a little bit to eat better.  - Vitamin D, Total (25-Hydroxy)      PLAN:  Patient Instructions   1. Labs-Dr. Muñoz will let you know by letter what the results are  2. Schedule an appointment with the sleep clinic to get a sleep study done.         Orders Placed This Encounter   Procedures     Mammo Screening Bilateral     Standing Status:   Future     Standing Expiration Date:   6/18/2020     Order Specific Question:   Can the procedure be changed per Radiologist protocol?     Answer:   Yes     CCP Antibodies     Vitamin D, Total (25-Hydroxy)     Urinalysis-UC if Indicated     Glycosylated Hemoglobin A1c     Ambulatory referral to Sleep Medicine     Referral Priority:   Routine     Referral Type:   Consultation     Referral Reason:   Evaluation and Treatment     Requested Specialty:   Sleep Medicine     Number of Visits Requested:   1     There are no discontinued medications.    Return in about 4 weeks (around 4/15/2019), or after the sleep study and labs.    CHIEF COMPLAINT:  Chief Complaint   Patient presents with     Establish Care     Concerns regarding extreme fatigue and spot on right ear       HISTORY OF PRESENT ILLNESS:  Veronica is a 68 y.o. female presenting to the clinic today for establishment of care. She has extreme fatigue and polyarthralgia. Her wrists and base of her thumb hurt, her knees and recently her elbows start to hurt. She has seen rheumatology and they felt she had OA. Her RF negative and ESR normal. She also has urine odor after [place] periodically. No dysuria or hematuria. Her fatigue is so extreme; she gets up more tired when she wakes up than when she goes to bed. Her  is disable and putting a lot of stress on her. She normally gets up to go to the bathroom 3 times per night and now since her fatigue has set in, she has been getting up 2 times per night. She takes ambien prn and starting this past summer, the next day she gets so depressed and crabby  "and it would \"put her over the edge mentally and emotionally.\" so she only takes it when she has a 12-13 hours day. She does have daytime somnolence in the AM and PM even when she is working at her desk. She listens to things over and over again-listening to English learners.   This fatigue has been for a while not but worse after Christmas of 2018.   Her BP has been slowly increasing over the years.   She also had an episode when she was walking at the [email or web address] where she had shortness of breath after climbing stairs. This is unusual for her since she is a hiker and active. She has gained 7 pounds since Christmas.   She has history of gestational diabetes.     REVIEW OF SYSTEMS:   11 system ROS were negative except as noted in HPI.     PFSH:  Social History     Tobacco Use   Smoking Status Former Smoker     Years: 10.00     Types: Cigarettes   Smokeless Tobacco Never Used   Tobacco Comment    Occasional smoking for a month at a time since quitting       Family History   Problem Relation Age of Onset     Diabetes Mother        Social History     Socioeconomic History     Marital status:      Spouse name: Not on file     Number of children: 2     Years of education: Not on file     Highest education level: Not on file   Occupational History     Not on file   Social Needs     Financial resource strain: Not on file     Food insecurity:     Worry: Not on file     Inability: Not on file     Transportation needs:     Medical: Not on file     Non-medical: Not on file   Tobacco Use     Smoking status: Former Smoker     Years: 10.00     Types: Cigarettes     Smokeless tobacco: Never Used     Tobacco comment: Occasional smoking for a month at a time since quitting   Substance and Sexual Activity     Alcohol use: Yes     Frequency: 4 or more times a week     Drug use: No     Sexual activity: Not on file   Lifestyle     Physical activity:     Days per week: Not on file     Minutes per session: Not on file     " Stress: Not on file   Relationships     Social connections:     Talks on phone: Not on file     Gets together: Not on file     Attends Mosque service: Not on file     Active member of club or organization: Not on file     Attends meetings of clubs or organizations: Not on file     Relationship status: Not on file     Intimate partner violence:     Fear of current or ex partner: Not on file     Emotionally abused: Not on file     Physically abused: Not on file     Forced sexual activity: Not on file   Other Topics Concern     Not on file   Social History Narrative     Not on file       Past Surgical History:   Procedure Laterality Date     CATARACT EXTRACTION, BILATERAL Bilateral 2009     KNEE CARTILAGE SURGERY Right 2011       No Known Allergies    Active Ambulatory Problems     Diagnosis Date Noted     Family history of diabetes mellitus in mother 01/14/2019     Chronic thumb pain, bilateral 01/14/2019     Fatigue, unspecified type 01/14/2019     Heartburn 01/14/2019     Primary osteoarthritis involving multiple joints 02/19/2019     Resolved Ambulatory Problems     Diagnosis Date Noted     No Resolved Ambulatory Problems     No Additional Past Medical History       VITALS:  Vitals:    03/18/19 0911   BP: 110/72   Patient Site: Right Arm   Patient Position: Sitting   Cuff Size: Adult Regular   Pulse: 80   Weight: 173 lb 12.8 oz (78.8 kg)     Wt Readings from Last 3 Encounters:   03/18/19 173 lb 12.8 oz (78.8 kg)   02/19/19 167 lb (75.8 kg)   01/14/19 167 lb (75.8 kg)     Body mass index is 33.94 kg/m .    PHYSICAL EXAM:  Constitutional:  In no distress.  Vitals:  Per nursing notes.  Ears: TMs and canals clear bilaterally.  Oropharynx:  Without posterior nasal drainage or thrush.  Neck:  Supple, thyroid not palpable and no LAD.  Cardiac:  Regular rate and rhythm without murmurs, rubs, or gallops. Carotids without bruits. Legs without edema. Palpation of the radial pulse regular.  Lungs: Clear bilaterally without  wheezing or rales.  Respiratory effort normal.  Abdomen:   Bowel sounds positive, nontender, nondistended.  Neither liver nor spleen palpable. No masses.   Skin:   Without rash, bruise, or palpable lesions.  Rheumatologic: Normal joints and nails of the hands.  She has no signs signs of synovitis in her joints of her hands or her feet today.  She is a little tender to her elbow to palpation bilaterally.  Neurologic:  Cranial nerves II-XII intact. MS intact     Psychiatric:  Mood appropriate, memory intact.     MEDICATIONS:  Current Outpatient Medications   Medication Sig Dispense Refill     b complex vitamins tablet Take 1 tablet by mouth daily.       cholecalciferol, vitamin D3, (VITAMIN D3 ORAL) Take by mouth.       diclofenac sodium (VOLTAREN TOP) Apply topically.       furosemide (LASIX) 20 MG tablet Take 20 mg by mouth 2 (two) times a day.       GLUCOSAMINE HCL ORAL Take by mouth.       multivit-minerals/ferrous gluc (MULTI-VALORIE ORAL) Take by mouth.       NEXIUM 40 mg capsule Take 1 capsule (40 mg total) by mouth daily before breakfast. 90 capsule 3     zolpidem (AMBIEN) 10 mg tablet Take 5 mg by mouth at bedtime as needed for sleep.       esomeprazole (NEXIUM) 40 MG capsule Take 40 mg by mouth daily before breakfast.       No current facility-administered medications for this visit.      Total time spent with patient and family was 45 minutes; greater than 50% of the time was on education regarding fatigue and polyarthralgia.     Raymundo Muñoz MD

## 2021-06-25 NOTE — PATIENT INSTRUCTIONS - HE
1. Labs-Dr. Muñoz will let you know by letter what the results are  2. Schedule an appointment with the sleep clinic to get a sleep study done.

## 2021-06-30 NOTE — PROGRESS NOTES
"Progress Notes by Mechelle Joseph PA-C at 4/20/2021  3:30 PM     Author: Mechelle Joseph PA-C Service: -- Author Type: Physician Assistant    Filed: 4/20/2021  4:58 PM Encounter Date: 4/20/2021 Status: Signed    : Mechelle Joseph PA-C (Physician Assistant)         Assessment & Plan:       1. RLQ abdominal pain  HM1(CBC and Differential)    Urinalysis-UC if Indicated      Medical Decision Making  Patient presents with acute onset right lower quadrant abdominal pain.  Physical exam today appears reassuring with no specific point tenderness on exam, normal vital signs, normal white blood cell count on CBC, and a negative urinalysis with no signs of UTI or hematuria.  Patient notes a \"crawling\" sensation in the right lower quadrant that appears to be more consistent with mild to moderate constipation/bloating.  Recommend patient continue drink plenty of clear fluids, go for light walks, and increase fiber in her diet.  She will follow closely with her primary care provider for recheck of symptoms if no symptom improvement.  Discussed signs of worsening symptoms and when to be seen immediately for reevaluation.    Patient Instructions   Patient Education     Constipation (Adult)  Constipation means that you have bowel movements that are less frequent than usual. Stools often become very hard and difficult to pass.  Constipation is very common. At some point in life it affects almost everyone. Since everyone's bowel habits are different, what is constipation to one person may not be to another. Your healthcare provider may do tests to diagnose constipation. It depends on what he or she finds when evaluating you.    Symptoms of constipation include:    Abdominal pain    Bloating    Vomiting    Painful bowel movements    Itching, swelling, bleeding, or pain around the anus  Causes  Constipation can have many causes. These include:    Diet low in fiber    Too much dairy    Not drinking enough " liquids    Lack of exercise or physical activity. This is especially true for older adults.    Changes in lifestyle or daily routine, including pregnancy, aging, work, and travel    Frequent use or misuse of laxatives    Ignoring the urge to have a bowel movement or delaying it until later    Medicines, such as certain prescription pain medicines, iron supplements, antacids, certain antidepressants, and calcium supplements    Diseases like irritable bowel syndrome, bowel obstructions, stroke, diabetes, thyroid disease, Parkinson disease, hemorrhoids, and colon cancer  Complications  Potential complications of constipation can include:    Hemorrhoids    Rectal bleeding from hemorrhoids or anal fissures (skin tears)    Hernias    Dependency on laxatives    Chronic constipation    Fecal impaction    Bowel obstruction or perforation  Home care  All treatment should be done after talking with your healthcare provider. This is especially true if you have another medical problems, are taking prescription medicines, or are an older adult. Treatment most often involves lifestyle changes. You may also need medicines. Your healthcare provider will tell you which will work best for you. Follow the advice below to help avoid this problem in the future.  Lifestyle changes  These lifestyle changes can help prevent constipation:    Diet. Eat a high-fiber diet, with fresh fruit and vegetables, and reduce dairy intake, meats, and processed foods    Fluids. It's important to get enough fluids each day. Drink plenty of water when you eat more fiber. If you are on diet that limits the amount of fluid you can have, talk about this with your healthcare provider.    Regular exercise. Check with your healthcare provider first.  Medicines  Take any medicines as directed. Some laxatives are safe to use only every now and then. Others can be taken on a regular basis. Talk with your doctor or pharmacist if you have questions.  Prescription pain  medicines can cause constipation. If you are taking this kind of medicine, ask your healthcare provider if you should also take a stool softener.  Medicines you may take to treat constipation include:    Fiber supplements    Stool softeners    Laxatives    Enemas    Rectal suppositories  Follow-up care  Follow up with your healthcare provider if symptoms don't get better in the next few days. You may need to have more tests or see a specialist.  Call 911  Call 911 if any of these occur:    Trouble breathing    Stiff, rigid abdomen that is severely painful to touch    Confusion    Fainting or loss of consciousness    Rapid heart rate    Chest pain  When to seek medical advice  Call your healthcare provider right away if any of these occur:    Fever of 100.4 F (38 C) or higher, or as directed by your healthcare provider    Failure to resume normal bowel movements    Pain in your abdomen or back gets worse    Nausea or vomiting    Swelling in your abdomen    Blood in the stool    Black, tarry stool    Involuntary weight loss    Weakness  Date Last Reviewed: 12/30/2015 2000-2017 [employer]. [address] [place] [address]. All rights reserved. This information is not intended as a substitute for professional medical care. Always follow your healthcare professional's instructions.         Patient Education     What Is Appendicitis?    Maybe your side hurt so much that you called your healthcare provider. Or maybe you went straight to the hospital emergency room. If the symptoms came on quickly, you may have appendicitis. This is an infection of the appendix. Surgery can remove the infection and relieve your symptoms. Read on to learn more.  Your appendix  The appendix is a hollow pouch about the size of your little finger. It hangs off the colon (large intestine). The purpose of the appendix is unclear. But if it becomes blocked, it may become infected.  Symptoms of appendicitis  Symptoms tend  to appear quickly, often over 1 to 2 days. Symptoms can include:    Pain that starts in the center of your belly and moves to your lower right side    Increased pain and pressure on your side when you walk    Vomiting, nausea, or decreased appetite    Fever or fatigue    Either diarrhea or constipation  How surgery helps  Medicine cant cure appendicitis. Surgery is needed to remove an infected appendix. This is called an appendectomy. This is a very common procedure. Removing the appendix should not affect your long-term health. Its best to remove the appendix before it bursts. If an infected or burst appendix is not removed, it can cause severe health problems.  Date Last Reviewed: 4/1/2019 2000-2019 [employer]. [address] [place] [address], [place] [address]. All rights reserved. This information is not intended as a substitute for professional medical care. Always follow your healthcare professional's instructions.                 Subjective:       Veronica Collado is a 70 y.o. female here for evaluation of right lower quadrant abdominal pains.  Onset of symptoms was 2 to 3 days ago with gradual worsening since then.  Patient notes variation between sharp pains and a dull ache in the right lower quadrant.  Symptoms were initially coming and going, but after her walk this morning the pain remains consistent.  She describes the pain is moderate in severity.  She denies radiation of the pain.  She recently returned from a 1 week trip to [place] where she had to do a lot of work.  She noted feeling fatigued yesterday, but thinks this may have been due to her recent travels.  She otherwise is eating and drinking normally.  Patient is passing her stools regularly.  No fevers.  No history of abdominal procedures.    The following portions of the patient's history were reviewed and updated as appropriate: allergies, current medications and problem list.    Review of Systems  Pertinent items are noted in HPI.      Allergies  No Known Allergies    Family History   Problem Relation Age of Onset   ? Diabetes Mother        Social History     Socioeconomic History   ? Marital status:      Spouse name: None   ? Number of children: 2   ? Years of education: None   ? Highest education level: None   Occupational History   ? None   Social Needs   ? Financial resource strain: None   ? Food insecurity     Worry: None     Inability: None   ? Transportation needs     Medical: None     Non-medical: None   Tobacco Use   ? Smoking status: Current Some Day Smoker     Years: 10.00     Types: Cigarettes   ? Smokeless tobacco: Never Used   ? Tobacco comment: Occasional smoking for a month at a time since quitting   Substance and Sexual Activity   ? Alcohol use: Yes     Frequency: 4 or more times a week   ? Drug use: No   ? Sexual activity: None   Lifestyle   ? Physical activity     Days per week: None     Minutes per session: None   ? Stress: None   Relationships   ? Social connections     Talks on phone: None     Gets together: None     Attends Hoahaoism service: None     Active member of club or organization: None     Attends meetings of clubs or organizations: None     Relationship status: None   ? Intimate partner violence     Fear of current or ex partner: None     Emotionally abused: None     Physically abused: None     Forced sexual activity: None   Other Topics Concern   ? None   Social History Narrative    She does work.  Her  is on disability with progressive motor disease: peripheral lateral sclerosis condition[facility] Has been 16 years of this progression; he is in a wheel chair.     She does everything to maintain the house.     Former smoker.    Jessica Russo MD         Objective:       /81   Pulse 65   Temp 98.4  F (36.9  C)   Resp 18   Wt 173 lb (78.5 kg)   LMP  (LMP Unknown)   SpO2 98%   Breastfeeding No   BMI 34.36 kg/m    General appearance: alert, appears stated age,  cooperative, no distress and non-toxic  Back: No CVA tenderness  Lungs: clear to auscultation bilaterally  Heart: regular rate and rhythm, S1, S2 normal, no murmur, click, rub or gallop  Abdomen: Patient notes pain in the right lower quadrant, but it is not provoked by palpation, no other masses or organomegaly, abdomen soft, normal bowel sounds  Skin: Skin color, texture, turgor normal. No rashes or lesions     Lab Results    Recent Results (from the past 24 hour(s))   Urinalysis-UC if Indicated   Result Value Ref Range    Color, UA Yellow Colorless, Yellow, Straw, Light Yellow    Clarity, UA Clear Clear    Glucose, UA Negative Negative    Protein, UA Negative Negative    Bilirubin, UA Negative Negative    Urobilinogen, UA 0.2 E.U./dL 0.2 E.U./dL, 1.0 E.U./dL    pH, UA 5.5 5.0 - 8.0    Blood, UA Negative Negative    Ketones, UA Negative Negative    Nitrite, UA Negative Negative    Leukocytes, UA Negative Negative    Specific Gravity, UA 1.025 1.005 - 1.030   HM1 (CBC with Diff)   Result Value Ref Range    WBC 6.0 4.0 - 11.0 thou/uL    RBC 4.64 3.80 - 5.40 mill/uL    Hemoglobin 14.7 12.0 - 16.0 g/dL    Hematocrit 44.4 35.0 - 47.0 %    MCV 96 80 - 100 fL    MCH 31.7 27.0 - 34.0 pg    MCHC 33.1 32.0 - 36.0 g/dL    RDW 12.8 11.0 - 14.5 %    Platelets 296 140 - 440 thou/uL    MPV 8.4 7.0 - 10.0 fL    Neutrophils % 59 50 - 70 %    Lymphocytes % 28 20 - 40 %    Monocytes % 10 2 - 10 %    Eosinophils % 3 0 - 6 %    Basophils % 1 0 - 2 %    Immature Granulocyte % 0 <=0 %    Neutrophils Absolute 3.5 2.0 - 7.7 thou/uL    Lymphocytes Absolute 1.7 0.8 - 4.4 thou/uL    Monocytes Absolute 0.6 0.0 - 0.9 thou/uL    Eosinophils Absolute 0.2 0.0 - 0.4 thou/uL    Basophils Absolute 0.1 0.0 - 0.2 thou/uL    Immature Granulocyte Absolute 0.0 <=0.0 thou/uL     I personally reviewed these results and discussed findings with the patient.

## 2021-07-04 NOTE — LETTER
Letter by Nino Lenz MD at      Author: Nino Lenz MD Service: -- Author Type: --    Filed:  Encounter Date: 6/2/2021 Status: (Other)         Veronica Collado  [address] [place] [address]  [facility] [address]      June 2, 2021      Dear Veronica,    As a valued [facility] patient, your healthcare needs are our priority.  Your health care team has determined that you are due for an appointment regarding your Medicare Annual Wellness Visit.    To help prevent delays in your care, please call the [facility] at [phone].    We look forward to partnering with you to achieve optimal health and wellbeing.    Sincerely,  Your care team at [facility]

## 2021-10-10 ENCOUNTER — HEALTH MAINTENANCE LETTER (OUTPATIENT)
Age: 71
End: 2021-10-10

## 2021-11-04 ENCOUNTER — OFFICE VISIT (OUTPATIENT)
Dept: FAMILY MEDICINE | Facility: CLINIC | Age: 71
End: 2021-11-04
Payer: MEDICARE

## 2021-11-04 VITALS
SYSTOLIC BLOOD PRESSURE: 122 MMHG | DIASTOLIC BLOOD PRESSURE: 78 MMHG | HEART RATE: 72 BPM | BODY MASS INDEX: 34.71 KG/M2 | WEIGHT: 174.8 LBS

## 2021-11-04 DIAGNOSIS — F51.01 PRIMARY INSOMNIA: Primary | ICD-10-CM

## 2021-11-04 DIAGNOSIS — Z12.31 VISIT FOR SCREENING MAMMOGRAM: ICD-10-CM

## 2021-11-04 DIAGNOSIS — R60.9 EDEMA, UNSPECIFIED TYPE: ICD-10-CM

## 2021-11-04 DIAGNOSIS — M15.0 PRIMARY OSTEOARTHRITIS INVOLVING MULTIPLE JOINTS: ICD-10-CM

## 2021-11-04 DIAGNOSIS — R12 HEARTBURN: ICD-10-CM

## 2021-11-04 DIAGNOSIS — F41.9 ANXIETY: ICD-10-CM

## 2021-11-04 PROCEDURE — G0008 ADMIN INFLUENZA VIRUS VAC: HCPCS | Performed by: FAMILY MEDICINE

## 2021-11-04 PROCEDURE — 99214 OFFICE O/P EST MOD 30 MIN: CPT | Mod: 25 | Performed by: FAMILY MEDICINE

## 2021-11-04 PROCEDURE — 90662 IIV NO PRSV INCREASED AG IM: CPT | Performed by: FAMILY MEDICINE

## 2021-11-04 RX ORDER — FUROSEMIDE 20 MG
20 TABLET ORAL DAILY PRN
Qty: 90 TABLET | Refills: 1 | Status: SHIPPED | OUTPATIENT
Start: 2021-11-04

## 2021-11-04 RX ORDER — RIBOFLAVIN (VITAMIN B2) 100 MG
100 TABLET ORAL 3 TIMES DAILY
COMMUNITY

## 2021-11-04 RX ORDER — CELECOXIB 100 MG/1
100 CAPSULE ORAL DAILY
Qty: 90 CAPSULE | Refills: 1 | Status: SHIPPED | OUTPATIENT
Start: 2021-11-04

## 2021-11-04 RX ORDER — LORAZEPAM 0.5 MG/1
0.5 TABLET ORAL DAILY PRN
Qty: 30 TABLET | Refills: 1 | Status: SHIPPED | OUTPATIENT
Start: 2021-11-04 | End: 2022-06-07

## 2021-11-04 RX ORDER — ESOMEPRAZOLE MAGNESIUM 40 MG
40 CAPSULE,DELAYED RELEASE (ENTERIC COATED) ORAL
Qty: 90 CAPSULE | Refills: 3 | Status: SHIPPED | OUTPATIENT
Start: 2021-11-04

## 2021-11-04 RX ORDER — ZOLPIDEM TARTRATE 10 MG/1
5 TABLET ORAL
Qty: 90 TABLET | Refills: 1 | Status: SHIPPED | OUTPATIENT
Start: 2021-11-04 | End: 2022-06-07

## 2021-11-08 NOTE — PROGRESS NOTES
"  Assessment & Plan     Primary insomnia    This seems to be working well for her insomnia, and she has taken it for years without any trouble.    - zolpidem (AMBIEN) 10 MG tablet; Take 0.5 tablets (5 mg) by mouth nightly as needed for sleep    Heartburn  She has also used this chronically, and it works very well for her reflux/heartburn.    - NEXIUM 40 MG DR capsule; Take 1 capsule (40 mg) by mouth daily before breakfast    Primary osteoarthritis involving multiple joints    She would like a refill of this medication as well, it helps with her joint pains without bothering her stomach.    - celecoxib (CELEBREX) 100 MG capsule; Take 1 capsule (100 mg) by mouth daily    Edema, unspecified type    She uses this on a sporadic basis for some swelling, which she has done for some time with good success.    - furosemide (LASIX) 20 MG tablet; Take 1 tablet (20 mg) by mouth daily as needed    Anxiety    This is also something that she uses very rarely, and only when her anxiety is quite bad.  We can monitor her usage going forward.    - LORazepam (ATIVAN) 0.5 MG tablet; Take 1 tablet (0.5 mg) by mouth daily as needed for anxiety    Visit for screening mammogram    - *MA Screening Digital Bilateral; Future    Review of external notes as documented elsewhere in note  Prescription drug management         Tobacco Cessation:   reports that she has been smoking cigarettes and cigarettes. She has smoked for the past 10.00 years. She has never used smokeless tobacco.      BMI:   Estimated body mass index is 34.71 kg/m  as calculated from the following:    Height as of 1/26/21: 1.511 m (4' 11.5\").    Weight as of this encounter: 79.3 kg (174 lb 12.8 oz).           No follow-ups on file.    Nino Lenz MD  [facility]    Mayra Martin is a 70 year old who presents for the following health issues     HPI     Patient is here today for an interval visit.  She needs refills of many of her medications " today.  We reviewed her problem list and the medications that she takes.  Overall she is doing very well with her ongoing problems with the medication that she takes.      Review of Systems   Constitutional, HEENT, cardiovascular, pulmonary, GI, , musculoskeletal, neuro, skin, endocrine and psych systems are negative, except as otherwise noted.      Objective    /78 (BP Location: Left arm, Patient Position: Sitting, Cuff Size: Adult Regular)   Pulse 72   Wt 79.3 kg (174 lb 12.8 oz)   BMI 34.71 kg/m    Body mass index is 34.71 kg/m .  Physical Exam   GENERAL: healthy, alert and no distress  NECK: no adenopathy, no asymmetry, masses, or scars and thyroid normal to palpation  RESP: lungs clear to auscultation - no rales, rhonchi or wheezes  CV: regular rate and rhythm, normal S1 S2, no S3 or S4, no murmur, click or rub, no peripheral edema and peripheral pulses strong  ABDOMEN: soft, nontender, no hepatosplenomegaly, no masses and bowel sounds normal  MS: no gross musculoskeletal defects noted, no edema

## 2021-11-17 ENCOUNTER — TRANSFERRED RECORDS (OUTPATIENT)
Dept: HEALTH INFORMATION MANAGEMENT | Facility: CLINIC | Age: 71
End: 2021-11-17
Payer: MEDICARE

## 2021-12-15 ENCOUNTER — OFFICE VISIT (OUTPATIENT)
Dept: FAMILY MEDICINE | Facility: CLINIC | Age: 71
End: 2021-12-15
Payer: MEDICARE

## 2021-12-15 VITALS — BODY MASS INDEX: 34.75 KG/M2 | WEIGHT: 175 LBS

## 2021-12-15 DIAGNOSIS — M15.0 PRIMARY OSTEOARTHRITIS INVOLVING MULTIPLE JOINTS: Primary | ICD-10-CM

## 2021-12-15 DIAGNOSIS — I83.211: ICD-10-CM

## 2021-12-15 DIAGNOSIS — M54.41 ACUTE RIGHT-SIDED LOW BACK PAIN WITH RIGHT-SIDED SCIATICA: ICD-10-CM

## 2021-12-15 DIAGNOSIS — L97.119: ICD-10-CM

## 2021-12-15 PROBLEM — I83.91 VARICOSE VEINS OF RIGHT LOWER EXTREMITY: Status: ACTIVE | Noted: 2021-12-15

## 2021-12-15 PROCEDURE — 99214 OFFICE O/P EST MOD 30 MIN: CPT | Performed by: FAMILY MEDICINE

## 2021-12-15 NOTE — PROGRESS NOTES
ASSESSMENT:  (M89.49) Primary osteoarthritis involving multiple joints  (primary encounter diagnosis)  Comment: Patient with a known history of osteoarthritis particularly affecting the right knee followed by [facility]  Plan: Physical Therapy Referral             (M54.41) Acute right-sided low back pain with right-sided sciatica  Comment: Patient with a 1 month history of numbness of the right leg I suspect lumbar degenerative changes  Plan: Physical Therapy Referral             (I83.211,  L97.119) Varicose veins of right lower extremity with both ulcer of thigh and inflammation, unspecified ulcer stage (H)  Comment: Patient with a known history of right lower extremity varicosities symptomatic  Plan: Vascular Surgery Referral                 PLAN:  1.  Patient is already followed regularly by [facility]  2.  Physical therapy for the right leg we could add the right knee as well.  3.  Referral to vascular surgery  4.  Follow-up as needed    No orders of the defined types were placed in this encounter.    There are no discontinued medications.    No follow-ups on file.    CHIEF COMPLAINT:  chief complaint    SUBJECTIVE:  Veronica is a 71 year old female who comes in with several concerns.  The patient has a known history of right knee osteoarthritis, followed by [facility] just about 3 weeks ago she had a cortisone injection into the right knee which she reports not only did help but she thinks her right knee actually may be worse.  She is also has a pre-existing meniscal tear of the right knee and has been told she eventually will need knee replacement.    For about the past month however she has had a sensation of numbness along the right leg and some occasional intermittent recurrent pain in the right lower back right upper buttock area.  This was not preceded by any injury or any obvious change in her usual level of activity no weakness in the leg but I told the patient that I am suspicious  for lumbar degenerative changes and a possible nerve involvement causing the numbness which I think is a separate issue from her knee.    Patient also has a known history of varicose veins of the right leg there is an ache and throbbing sensation, the patient is interested in if necessary surgical intervention for this because of the symptoms.    No other significant change in her health status      REVIEW OF SYSTEMS:      All other systems are negative.    PFSH:  Immunization History   Administered Date(s) Administered     COVID-19,PF,Moderna 03/09/2021, 04/06/2021     Flu, Unspecified 11/03/2019     Influenza, Quad, High Dose, Pf, 65yr+ (Fluzone HD) 11/04/2021     Social History     Socioeconomic History     Marital status:      Spouse name: Not on file     Number of children: 2     Years of education: Not on file     Highest education level: Not on file   Occupational History     Not on file   Tobacco Use     Smoking status: Current Some Day Smoker     Years: 10.00     Types: Cigarettes, Cigarettes     Smokeless tobacco: Never Used     Tobacco comment: Occasional smoking for a month at a time since quitting   Substance and Sexual Activity     Alcohol use: Yes     Drug use: No     Sexual activity: Not on file   Other Topics Concern     Not on file   Social History Narrative    She does work.  Her  is on disability with progressive motor disease: peripheral lateral sclerosis condition, [facility] Has been 16 years of this progression; he is in a wheel chair.   She does everything to maintain the house.   Former  smoker.  Jessica Russo MD       Social Determinants of Health     Financial Resource Strain: Not on file   Food Insecurity: Not on file   Transportation Needs: Not on file   Physical Activity: Not on file   Stress: Not on file   Social Connections: Not on file   Intimate Partner Violence: Not on file   Housing Stability: Not on file     Past Medical History:   Diagnosis Date      Family history of diabetes mellitus in mother 1/14/2019     Family History   Problem Relation Age of Onset     Diabetes Mother        MEDICATIONS:  Current Outpatient Medications   Medication Sig Dispense Refill     b complex vitamins tablet [B COMPLEX VITAMINS TABLET] Take 1 tablet by mouth daily.       celecoxib (CELEBREX) 100 MG capsule Take 1 capsule (100 mg) by mouth daily 90 capsule 1     cholecalciferol, vitamin D3, (VITAMIN D3 ORAL) [CHOLECALCIFEROL, VITAMIN D3, (VITAMIN D3 ORAL)] Take by mouth.       diclofenac sodium (VOLTAREN TOP) [DICLOFENAC SODIUM (VOLTAREN TOP)] Apply topically.       furosemide (LASIX) 20 MG tablet Take 1 tablet (20 mg) by mouth daily as needed 90 tablet 1     GLUCOSAMINE HCL ORAL [GLUCOSAMINE HCL ORAL] Take by mouth.       LORazepam (ATIVAN) 0.5 MG tablet Take 1 tablet (0.5 mg) by mouth daily as needed for anxiety 30 tablet 1     multivit-minerals/ferrous gluc (MULTI-VALORIE ORAL) [MULTIVIT-MINERALS/FERROUS GLUC (MULTI-VALORIE ORAL)] Take by mouth.       NEXIUM 40 MG DR capsule Take 1 capsule (40 mg) by mouth daily before breakfast 90 capsule 3     vitamin C (ASCORBIC ACID) 100 MG tablet Take 100 mg by mouth 3 times daily       zolpidem (AMBIEN) 10 MG tablet Take 0.5 tablets (5 mg) by mouth nightly as needed for sleep 90 tablet 1       TOBACCO USE:  History   Smoking Status     Current Some Day Smoker     Years: 10.00     Types: Cigarettes, Cigarettes   Smokeless Tobacco     Never Used     Comment: Occasional smoking for a month at a time since quitting       VITALS:  Vitals:    12/15/21 1423   Weight: 79.4 kg (175 lb)     Wt Readings from Last 3 Encounters:   12/15/21 79.4 kg (175 lb)   11/04/21 79.3 kg (174 lb 12.8 oz)   04/20/21 78.5 kg (173 lb)       PHYSICAL EXAM:  Constitutional:   Reveals a female who appears overall healthy.  Vitals: per nursing notes.  Eyes:  EOMs full, PERRL.  Musculoskeletal: No peripheral swelling.  Examination of the right lower leg reveals some obvious  visible as well as palpable fairly prominent wormlike varicosities in the right lower leg below the knee.  I am able to manipulate the right hip both internal and external rotation I did not elicit any pain or tenderness.  Patient did have a slight amount of tenderness in the right lower part of the back upper buttock area.  Plantar and dorsiflexion was 5+ out of 5 bilaterally and her gait was normal I did not see any limping.      Neuro:  Alert and oriented. Cranial nerves, motor, sensory exams are intact.  No gross focal deficits.  Psychiatric:  Memory intact, mood appropriate.    QUALITY MEASURES:      DATA REVIEWED:

## 2021-12-28 ENCOUNTER — HOSPITAL ENCOUNTER (OUTPATIENT)
Dept: PHYSICAL THERAPY | Facility: REHABILITATION | Age: 71
End: 2021-12-28
Attending: FAMILY MEDICINE
Payer: MEDICARE

## 2021-12-28 DIAGNOSIS — M54.41 ACUTE RIGHT-SIDED LOW BACK PAIN WITH RIGHT-SIDED SCIATICA: ICD-10-CM

## 2021-12-28 DIAGNOSIS — M15.0 PRIMARY OSTEOARTHRITIS INVOLVING MULTIPLE JOINTS: ICD-10-CM

## 2021-12-28 PROCEDURE — 97161 PT EVAL LOW COMPLEX 20 MIN: CPT | Mod: GP | Performed by: PHYSICAL THERAPIST

## 2021-12-28 PROCEDURE — 97110 THERAPEUTIC EXERCISES: CPT | Mod: GP | Performed by: PHYSICAL THERAPIST

## 2021-12-28 PROCEDURE — 97535 SELF CARE MNGMENT TRAINING: CPT | Mod: GP | Performed by: PHYSICAL THERAPIST

## 2021-12-28 NOTE — PROGRESS NOTES
"Daily Note    Assessment: see eval    Date    Exercise 12/28/21   SLR x15-20   Hip add x20 with 5\" holds                                             Ana Caldera, PT, DPT  "

## 2021-12-28 NOTE — PROGRESS NOTES
[facility]    OUTPATIENT PHYSICAL THERAPY ORTHOPEDIC EVALUATION  PLAN OF TREATMENT FOR OUTPATIENT REHABILITATION  (COMPLETE FOR INITIAL CLAIMS ONLY)  Patient's Last Name, First Name, M.I.  YOB: 1950  Veronica Collado    Provider s Name:  [facility]   Medical Record No.  [id number]   Start of Care Date:  12/28/21   Onset Date:  12/15/21   Type:     _X__PT   ___OT   ___SLP Medical Diagnosis:  R knee OA, R lumbar radiculopathy     PT Diagnosis:  R pes anserine bursitis, R lumbar radiculitis, R knee OA   Visits from SOC:  1      _________________________________________________________________________________  Plan of Treatment/Functional Goals:  balance training,joint mobilization,manual therapy,neuromuscular re-education,ROM,strengthening,stretching           Goals  Goal Identifier: 1  Goal Description: Pt will be independent with management of her condition in order to improve her QOL  Target Date: 03/28/22    Goal Identifier: 2  Goal Description: Pt will have decreased GIOVANY by 6 points in order to improve her QOL  Target Date: 02/28/22    Goal Identifier: 2  Goal Description: Pt will have less than 2/10 knee pain on a daily basis so she does not need knee surgery.  Target Date: 03/28/22 12/28/21 1100   General Information   Type of Visit Initial OP Ortho PT Evaluation   Start of Care Date 12/28/21   Referring Physician Dr. Younger   Patient/Family Goals Statement avoid knee surgery   Orders Evaluate and Treat   Date of Order 12/15/21   Certification Required? Yes   Medical Diagnosis R lumbar radiculopathy, R knee OA   Surgical/Medical history reviewed Yes   Precautions/Limitations no known precautions/limitations   Presentation and Etiology   Pertinent history of current problem (include personal factors and/or comorbidities that impact the POC) chronic R knee  pain. Had R menisectomy 2014/2016. Wears R knee brace with activity. Chronic R low back pain. Gets nerve pain down entire R leg and into toes, althoguh symptoms are intermittent and vary.      Impairments A. Pain;C. Swelling;D. Decreased ROM;E. Decreased flexibility;F. Decreased strength and endurance   Functional Limitations perform activities of daily living;perform desired leisure / sports activities   Symptom Location R knee, R low back   How/Where did it occur From insidious onset   Onset date of current episode/exacerbation 12/15/21   Chronicity Chronic   Pain quality A. Sharp;E. Shooting   Frequency of pain/symptoms B. Intermittent   Pain/symptoms exacerbated by A. Sitting;B. Walking;G. 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 No   Have you fallen and had an injury in the past year? No   Is patient a fall risk? No   Abuse Screen (yes response referral indicated)   Feels Unsafe at Home or Work/School no   Feels Threatened by Someone no   Does Anyone Try to Keep You From Having Contact with Others or Doing Things Outside Your Home? no   Physical Signs of Abuse Present no   Knee Objective Findings   Side (if bilateral, select both right and left) Right   Observation gait: normal, non-antalgic   Posture fair   Knee ROM Comment L knee AROM: WFL, pain-free   Right Knee Extension AROM 0   Right Knee Flexion AROM 116, pain end-range   Lumbar Spine/SI Objective Findings   Flexion ROM hands to floor. hypermobility noted   Extension ROM hypermobility noted.   Right Side Bending ROM hypermobile. pain-free   Left Side Bending ROM hypermobile. pain-free   Lumbar ROM Comment B rot: hypermobile, pain-free   Hip Screen B hip PROM: WFL.  some R hip pain with IR ER   Hamstring Flexibility 80 B, pain-free   SLR neg B   Slump Test neg B   Sensation Testing not intact intermittent   Palpation R MJL, distal quad tendon, pes anserine   Planned Therapy Interventions   Planned Therapy Interventions balance training;joint mobilization;manual therapy;neuromuscular re-education;ROM;strengthening;stretching   Clinical Impression   Criteria for Skilled Therapeutic Interventions Met yes, treatment indicated   PT Diagnosis R pes anserine bursitis, R lumbar radiculitis, R knee OA   Influenced by the following impairments pain, weakness, hypermobility, decreased ROM   Functional limitations due to impairments yoga, running, strenuous hiking, long distance driving/biking/hiking   Clinical Presentation Stable/Uncomplicated   Clinical Presentation Rationale unchanging   Clinical Decision Making (Complexity) Low complexity   Predicted Duration of Therapy Intervention (days/wks) 12 weeks   Risk & Benefits of therapy have been explained Yes   Patient, Family & other staff in agreement with plan of care Yes   Ortho Goal 1   Goal Identifier 1   Goal Description Pt will be independent with management of her condition in order to improve her QOL   Goal Progress in progress   Target Date 03/28/22   Ortho Goal 2   Goal Identifier 2   Goal Description Pt will have decreased GIOVANY by 6 points in order to improve her QOL   Goal Progress in progress   Target Date 02/28/22   Ortho Goal 3   Goal Identifier 2   Goal Description Pt will have less than 2/10 knee pain on a daily basis so she does not need knee surgery.   Goal Progress in progress   Target Date 03/28/22   Total Evaluation Time   PT Pallavial Low Complexity Minutes (10798) 25   Therapy Certification   Certification date from 12/28/21   Certification date to 03/28/22   Medical Diagnosis R knee OA, R lumbar radiculopathy        Therapy Frequency:   1-2x/week  Predicted Duration of Therapy Intervention:  12 weeks    Ana Caldera  PT, DPT                 I CERTIFY THE NEED FOR THESE SERVICES FURNISHED UNDER        THIS PLAN OF TREATMENT AND WHILE UNDER MY CARE     (Physician co-signature of this document indicates review and certification of the therapy plan).                       Certification Date From:  12/28/21   Certification Date To:  03/28/22    Referring Provider:  Dr. Younger    Initial Assessment        See Epic Evaluation Start of Care Date: 12/28/21

## 2021-12-28 NOTE — ADDENDUM NOTE
Encounter addended by: Ana Caldera, PT on: 12/28/2021 2:58 PM   Actions taken: Charge Capture section accepted

## 2022-01-04 ENCOUNTER — HOSPITAL ENCOUNTER (OUTPATIENT)
Dept: PHYSICAL THERAPY | Facility: REHABILITATION | Age: 72
End: 2022-01-04
Payer: MEDICARE

## 2022-01-04 DIAGNOSIS — M15.0 PRIMARY OSTEOARTHRITIS INVOLVING MULTIPLE JOINTS: Primary | ICD-10-CM

## 2022-01-04 DIAGNOSIS — M54.41 ACUTE RIGHT-SIDED LOW BACK PAIN WITH RIGHT-SIDED SCIATICA: ICD-10-CM

## 2022-01-04 PROCEDURE — 97110 THERAPEUTIC EXERCISES: CPT | Mod: GP | Performed by: PHYSICAL THERAPIST

## 2022-01-04 NOTE — PROGRESS NOTES
"Daily Note    Assessment: Pt reports being compliant with her HEP. Her knee has been more painful the past few days but isn't sure why. She also has some R calf pain still. She also noticed back pain when doing hip add. Pt tolerated session well today but needed some cueing for correct technique. Weakness also noted with exercises today. Some modifications needed with hip add exs.     Date 1/4/22 12/28/21   Exercise     SLR  x15-20   Hip add Reviewed in sitting and supine. VC for correct technique. Best performed in supine with firmer obj. x20 with 5\" holds   clamshell Until fatigue, B    bridge x10                                              Ana Caldera, PT, DPT  "

## 2022-01-14 ENCOUNTER — HOSPITAL ENCOUNTER (OUTPATIENT)
Dept: PHYSICAL THERAPY | Facility: REHABILITATION | Age: 72
End: 2022-01-14
Payer: MEDICARE

## 2022-01-14 DIAGNOSIS — M15.0 PRIMARY OSTEOARTHRITIS INVOLVING MULTIPLE JOINTS: Primary | ICD-10-CM

## 2022-01-14 DIAGNOSIS — M54.41 ACUTE RIGHT-SIDED LOW BACK PAIN WITH RIGHT-SIDED SCIATICA: ICD-10-CM

## 2022-01-14 PROCEDURE — 97110 THERAPEUTIC EXERCISES: CPT | Mod: GP | Performed by: PHYSICAL THERAPIST

## 2022-01-14 NOTE — PROGRESS NOTES
"Daily Note    Assessment: Pt reports some decreased pain overall and improvement transferring from floor to standing. She demonstrated difficulty engaging her R quads due to not wanting to hyperextend her R knee. This led to her R hip flexor overworking and hurting while performing her R SLR. We discussed the importance of extending her R knee for various activities in order to get her R quad to engage/support the knee. Her HEP was modified today to work on engagement of her R quad.     Date 1/14/22 1/4/22 12/28/21   Exercise      SLR Rev'd and terminated. HOLD due to hip flexor compensation.   x15-20   Hip add  Reviewed in sitting and supine. VC for correct technique. Best performed in supine with firmer obj. x20 with 5\" holds   clamshell W/ L2 Until fatigue, B    bridge  x10    SAQ 5\" holds                                                 Ana Caldera, PT, DPT  "

## 2022-01-21 ENCOUNTER — HOSPITAL ENCOUNTER (OUTPATIENT)
Dept: PHYSICAL THERAPY | Facility: REHABILITATION | Age: 72
End: 2022-01-21
Payer: MEDICARE

## 2022-01-21 DIAGNOSIS — M54.41 ACUTE RIGHT-SIDED LOW BACK PAIN WITH RIGHT-SIDED SCIATICA: ICD-10-CM

## 2022-01-21 DIAGNOSIS — M15.0 PRIMARY OSTEOARTHRITIS INVOLVING MULTIPLE JOINTS: Primary | ICD-10-CM

## 2022-01-21 PROCEDURE — 97110 THERAPEUTIC EXERCISES: CPT | Mod: GP | Performed by: PHYSICAL THERAPIST

## 2022-01-21 PROCEDURE — 97112 NEUROMUSCULAR REEDUCATION: CPT | Mod: GP | Performed by: PHYSICAL THERAPIST

## 2022-01-21 NOTE — PROGRESS NOTES
"Daily Note    Assessment: Pt isn't sure how much progress she's made, as it's been too cold out to walk much. She tolerated progression of her HEP well today but is still quite tender over R pes anserine. She does c/o pain in R popliteal fossa so we will look into taping this next session as well.    Date 1/21/22 1/14/22 1/4/22 12/28/21   Exercise       SLR  Rev'd and terminated. HOLD due to hip flexor compensation.   x15-20   Hip add   Reviewed in sitting and supine. VC for correct technique. Best performed in supine with firmer obj. x20 with 5\" holds   clamshell  W/ L2 Until fatigue, B    bridge   x10    SAQ  5\" holds     SLS 1' B      Mini side lunges x10 B                                           Ana Caldera, PT, DPT  "

## 2022-01-26 ENCOUNTER — HOSPITAL ENCOUNTER (OUTPATIENT)
Dept: PHYSICAL THERAPY | Facility: REHABILITATION | Age: 72
End: 2022-01-26
Payer: MEDICARE

## 2022-01-26 DIAGNOSIS — M15.0 PRIMARY OSTEOARTHRITIS INVOLVING MULTIPLE JOINTS: Primary | ICD-10-CM

## 2022-01-26 DIAGNOSIS — M54.41 ACUTE RIGHT-SIDED LOW BACK PAIN WITH RIGHT-SIDED SCIATICA: ICD-10-CM

## 2022-01-26 PROCEDURE — 97110 THERAPEUTIC EXERCISES: CPT | Mod: GP | Performed by: PHYSICAL THERAPIST

## 2022-01-26 PROCEDURE — 97112 NEUROMUSCULAR REEDUCATION: CPT | Mod: GP | Performed by: PHYSICAL THERAPIST

## 2022-01-26 NOTE — PROGRESS NOTES
"Daily Note    Assessment: Pt reports her knee being flared up the past couple of days but isn't 100% sure why, possibly working/sitting more. Her KT only stayed on for a day. Used a tape adherent today and will see if this helps her KT stick longer. It's likely her brace rubbed on her tape which caused it to not stick as long.      Date 1/26/22 1/21/22 1/14/22 1/4/22 12/28/21   Exercise        SLR   Rev'd and terminated. HOLD due to hip flexor compensation.   x15-20   Hip add    Reviewed in sitting and supine. VC for correct technique. Best performed in supine with firmer obj. x20 with 5\" holds   clamshell   W/ L2 Until fatigue, B    bridge    x10    SAQ   5\" holds     SLS  1' B      Mini side lunges  x10 B      Nu step L5, 4'                                         Ana Caldera, PT, DPT  "

## 2022-02-02 ENCOUNTER — OFFICE VISIT (OUTPATIENT)
Dept: VASCULAR SURGERY | Facility: CLINIC | Age: 72
End: 2022-02-02
Attending: FAMILY MEDICINE
Payer: MEDICARE

## 2022-02-02 ENCOUNTER — ANCILLARY PROCEDURE (OUTPATIENT)
Dept: VASCULAR ULTRASOUND | Facility: CLINIC | Age: 72
End: 2022-02-02
Attending: SPECIALIST
Payer: MEDICARE

## 2022-02-02 VITALS — TEMPERATURE: 98.4 F | HEART RATE: 72 BPM | SYSTOLIC BLOOD PRESSURE: 116 MMHG | DIASTOLIC BLOOD PRESSURE: 70 MMHG

## 2022-02-02 DIAGNOSIS — I83.211: ICD-10-CM

## 2022-02-02 DIAGNOSIS — L97.119: ICD-10-CM

## 2022-02-02 PROCEDURE — G0463 HOSPITAL OUTPT CLINIC VISIT: HCPCS

## 2022-02-02 PROCEDURE — 99203 OFFICE O/P NEW LOW 30 MIN: CPT | Performed by: SPECIALIST

## 2022-02-02 PROCEDURE — 93970 EXTREMITY STUDY: CPT

## 2022-02-02 ASSESSMENT — PAIN SCALES - GENERAL: PAINLEVEL: NO PAIN (0)

## 2022-02-02 NOTE — NURSING NOTE
This is a new consult for Varicose Veins. The patient has varicose veins that are problematic in bilateral legs. Symptoms patient has been experiencing are aching, cramps, discoloration and  swelling. Patient stated she was recently in [place] and noticed extreme swelling in her right calf and redness/heat. Patient has not been wearing compression stockings.     Discoloration  is present.  Pt has not been using pain medication or antiinflammatory's.

## 2022-02-02 NOTE — PROGRESS NOTES
[facility] Vein Consult      Assessment:     1. varicose veins, bilateral   2. spider veins, bilateral   3. Insuffiencey of bilateral greater saphenous vein,  right acessory saphenous vein    Plan:     1. Treatment options of conservative therapy of stockings use, exercise, weight loss, elevating legs when possible.    2. Script for compression stockings 20-30 mm hg  3. Ultrasound to evaluate legs for incompetency of both deep and superficial system .   4. Surgical treatment, discussed briefly today   Risks and benefits of surgical intervention including infection, burns, dvt, thrombophlebitis, not closing, recurrence, numbness and nerve injury and need for further intervention were all discused    5. Follow up: 3 months.   6. Call for any questions concerns or issues    Subjective:      Veronica Collado is a 71 year old female  who was referred by Nino Lenz  for evaluation of varicose veins. Symptoms include pain, aching, fatigue, burning, edema, dermatitis and episodes of superficial thrombophlebitis. Patient has history of leg selling, pain and vein issues that have progressed. Pain and symptoms have affected daily living and work activities needing medications. Here for evaluation today. no stocking or compression devic use    Allergies:Patient has no known allergies.    Past Medical History:   Diagnosis Date     Family history of diabetes mellitus in mother 1/14/2019       Past Surgical History:   Procedure Laterality Date     ARTHROSCOPY KNEE PROCEDURE CARTILAGE (GENZYME) Right 2011     CATARACT EXTRACTION, BILATERAL Bilateral 2009         Current Outpatient Medications:      b complex vitamins tablet, [B COMPLEX VITAMINS TABLET] Take 1 tablet by mouth daily., Disp: , Rfl:      celecoxib (CELEBREX) 100 MG capsule, Take 1 capsule (100 mg) by mouth daily, Disp: 90 capsule, Rfl: 1     cholecalciferol, vitamin D3, (VITAMIN D3 ORAL), [CHOLECALCIFEROL, VITAMIN D3, (VITAMIN D3 ORAL)] Take by mouth.,  Disp: , Rfl:      diclofenac sodium (VOLTAREN TOP), [DICLOFENAC SODIUM (VOLTAREN TOP)] Apply topically., Disp: , Rfl:      furosemide (LASIX) 20 MG tablet, Take 1 tablet (20 mg) by mouth daily as needed, Disp: 90 tablet, Rfl: 1     GLUCOSAMINE HCL ORAL, [GLUCOSAMINE HCL ORAL] Take by mouth., Disp: , Rfl:      LORazepam (ATIVAN) 0.5 MG tablet, Take 1 tablet (0.5 mg) by mouth daily as needed for anxiety, Disp: 30 tablet, Rfl: 1     multivit-minerals/ferrous gluc (MULTI-VALORIE ORAL), [MULTIVIT-MINERALS/FERROUS GLUC (MULTI-VALORIE ORAL)] Take by mouth., Disp: , Rfl:      NEXIUM 40 MG DR capsule, Take 1 capsule (40 mg) by mouth daily before breakfast, Disp: 90 capsule, Rfl: 3     vitamin C (ASCORBIC ACID) 100 MG tablet, Take 100 mg by mouth 3 times daily, Disp: , Rfl:      zolpidem (AMBIEN) 10 MG tablet, Take 0.5 tablets (5 mg) by mouth nightly as needed for sleep, Disp: 90 tablet, Rfl: 1     Family History   Problem Relation Age of Onset     Diabetes Mother         reports that she has been smoking cigarettes. She has a 2.50 pack-year smoking history. She has never used smokeless tobacco. She reports current alcohol use. She reports that she does not use drugs.      Review of Systems:    Pertinent items are noted in HPI.  Patient has symptomatic veins and changes of bilateral legs. These have progressed to the point of causing symptoms on a daily basis. This causes issues with daily activities and chores such as washing dishes, vacuuming, outdoor upkeep and standing for long lengths of time       Objective:     Vitals:    02/02/22 0944   BP: 116/70   Pulse: 72   Temp: 98.4  F (36.9  C)     There is no height or weight on file to calculate BMI.    EXAM:  GENERAL: This is a well-developed 71 year old female who appears her stated age  HEAD: normocephalic  HEENT: Pupils equal and reactive bilaterally  MOUTH: mucus membranes intact. Normal dentation  CARDIAC: RRR without murmur  CHEST/LUNG:  Clear to auscultation  bilaterally  ABDOMEN: Soft, nontender, nondistended, no masses noted   NEUROLOGIC: Focally intact, nonfocal, alert and oriented x 3  INTEGUMENT: No open lesions or ulcers  VASCULAR: Pulses intact, symmetrical upper and lower extremities. There areskin changes consistent with chronic venous insufficiency. Varicose veins present in bilateral greater saphenous distribution. Spider veins present bilateral.                Imaging:    PACS Images     Show images for US Venous Competency Bilateral    Study Result    Narrative & Impression   BILATERAL Venous Insufficiency Ultrasound (Date: 02/02/22)    BILATERAL Lower Extremity          Indication:  Symptomatic varicose vein/pain/swelling      Previous: None     Patient History: Phlebitis, Swelling and Stasis     Presenting Symptoms:  Swelling, Varicose Veins, Pain and Stasis     Technique:   Supine and Upright Ultrasound of the Deep and Superficial Veins with Valsalva and Compression Augmentation Maneuvers. Duplex Imaging is performed utilizing gray-scale, Two-dimensional images, color-flow imaging, Doppler waveform analysis, and Spectral doppler imaging done with provacative maneuvers.      Incompetency Criteria:  Deep vein reflux reported when greater than 1000 msec flow reversal. Superficial vein reflux reported when equal to or greater than 600 msec flow reversal.  vein reflux reported as greater than 350 msec flow reversal.      Right  Leg Deep Veins    CFV SFJ PFV PROX FV   PROX FV MID FV DIST POP V. PERON.   V. PTV'S   Compressibility  (FC,PC,NC) FC FC FC FC FC FC FC FC FC   Reflux -   - + + + +   -         Right Leg Saphenous Veins  Location SFJ PROX THIGH KNEE MID CALF SPJ PROX CALF MID CALF   RT GSV (mm) 9 8 8 4         Reflux + + + +         RT SSV (mm)         4 2 4   Reflux         - - -      Location SFJ PROX THIGH MID THIGH   RT AASV (mm) 11 5 6   Reflux + + +      Left Leg Deep Veins    CFV SFJ PFV PROX SFV PROX SFV MID SFV DIST POP V. PERON. V.  PTV'S   Compressibility  (FC,PC,NC) FC FC FC FC FC FC FC FC FC   Reflux +   - - - - -   -         Lt Leg Saphenous Veins   Location SFJ PROX THIGH KNEE MID CALF SPJ PROX CALF MID CALF   LT GSV (mm) 9 9 4 4         Reflux + + - +         LT SSV (mm)         4 3 6   Reflux         - - -      Impression:         Reference:     Compressibility: FC= Fully compressible, PC= Partially compressible, NC= Non-compressible, NV= Not Visualized     Reflux: (+) Incompetent  (-) Competent, (NV) = Not Visualized     Interpretation criteria:          Duration of Retrograde flow (milliseconds)  Category Deep Veins Superficial Veins  veins   Competent < 1000ms < 600ms < 350ms   Incompetent > 1000ms > 600ms > 350ms               Tre Kinney MD  General Surgery [phone]  Vascular Surgery [phone]

## 2022-02-04 ENCOUNTER — HOSPITAL ENCOUNTER (OUTPATIENT)
Dept: PHYSICAL THERAPY | Facility: REHABILITATION | Age: 72
End: 2022-02-04
Payer: MEDICARE

## 2022-02-04 DIAGNOSIS — M15.0 PRIMARY OSTEOARTHRITIS INVOLVING MULTIPLE JOINTS: Primary | ICD-10-CM

## 2022-02-04 DIAGNOSIS — M54.41 ACUTE RIGHT-SIDED LOW BACK PAIN WITH RIGHT-SIDED SCIATICA: ICD-10-CM

## 2022-02-04 PROCEDURE — 97110 THERAPEUTIC EXERCISES: CPT | Mod: GP | Performed by: PHYSICAL THERAPIST

## 2022-02-04 PROCEDURE — 97112 NEUROMUSCULAR REEDUCATION: CPT | Mod: GP | Performed by: PHYSICAL THERAPIST

## 2022-02-04 NOTE — PROGRESS NOTES
"Daily Note    Assessment: Pt reports less tenderness over her knee but more internal pain this past week. She thinks the KT helps. Will continue to progress with KT, MT, and her HEP per PT POC.    Date 2/4/22 1/26/22 1/21/22 1/14/22 1/4/22 12/28/21   Exercise         SLR    Rev'd and terminated. HOLD due to hip flexor compensation.   x15-20   Hip add     Reviewed in sitting and supine. VC for correct technique. Best performed in supine with firmer obj. x20 with 5\" holds   clamshell    W/ L2 Until fatigue, B    bridge     x10    SAQ    5\" holds     SLS   1' B      Mini side lunges   x10 B      Nu step L 5, 4' L5, 4'                                             Ana Caldera, PT, DPT  "

## 2022-02-16 ENCOUNTER — HOSPITAL ENCOUNTER (OUTPATIENT)
Dept: PHYSICAL THERAPY | Facility: REHABILITATION | Age: 72
End: 2022-02-16
Payer: MEDICARE

## 2022-02-16 DIAGNOSIS — M54.41 ACUTE RIGHT-SIDED LOW BACK PAIN WITH RIGHT-SIDED SCIATICA: ICD-10-CM

## 2022-02-16 DIAGNOSIS — M15.0 PRIMARY OSTEOARTHRITIS INVOLVING MULTIPLE JOINTS: Primary | ICD-10-CM

## 2022-02-16 PROCEDURE — 97110 THERAPEUTIC EXERCISES: CPT | Mod: GP | Performed by: PHYSICAL THERAPIST

## 2022-02-16 PROCEDURE — 97112 NEUROMUSCULAR REEDUCATION: CPT | Mod: GP | Performed by: PHYSICAL THERAPIST

## 2022-02-16 NOTE — PROGRESS NOTES
"Daily Note    Assessment: Pt seems to be responding well to her HEP and KT, as she reports 50% progress to date. Will continue progressing her HEP and taping next session.     Date 2/16/22 2/4/22 1/26/22 1/21/22 1/14/22 1/4/22 12/28/21   Exercise          SLR     Rev'd and terminated. HOLD due to hip flexor compensation.   x15-20   Hip add      Reviewed in sitting and supine. VC for correct technique. Best performed in supine with firmer obj. x20 with 5\" holds   clamshell     W/ L2 Until fatigue, B    bridge      x10    SAQ     5\" holds     SLS    1' B      Mini side lunges    x10 B      Nu step L5, 4' L 5, 4' L5, 4'                                                 Ana Caldera, PT, DPT  "

## 2022-02-25 ENCOUNTER — HOSPITAL ENCOUNTER (OUTPATIENT)
Dept: PHYSICAL THERAPY | Facility: REHABILITATION | Age: 72
End: 2022-02-25
Payer: MEDICARE

## 2022-02-25 DIAGNOSIS — M15.0 PRIMARY OSTEOARTHRITIS INVOLVING MULTIPLE JOINTS: Primary | ICD-10-CM

## 2022-02-25 DIAGNOSIS — M54.41 ACUTE RIGHT-SIDED LOW BACK PAIN WITH RIGHT-SIDED SCIATICA: ICD-10-CM

## 2022-02-25 PROCEDURE — 97112 NEUROMUSCULAR REEDUCATION: CPT | Mod: GP | Performed by: PHYSICAL THERAPIST

## 2022-02-25 PROCEDURE — 97110 THERAPEUTIC EXERCISES: CPT | Mod: GP | Performed by: PHYSICAL THERAPIST

## 2022-02-25 NOTE — PROGRESS NOTES
"Daily Note    Assessment: Pt was taught how to tape her pes anserine today. She is going to trial this at home, continue with her HEP, and return in 2 weeks    Subj: feels she continues to make progress     Date 2/25/22 2/16/22 2/4/22 1/26/22 1/21/22 1/14/22 1/4/22 12/28/21   Exercise           SLR      Rev'd and terminated. HOLD due to hip flexor compensation.   x15-20   Hip add       Reviewed in sitting and supine. VC for correct technique. Best performed in supine with firmer obj. x20 with 5\" holds   clamshell      W/ L2 Until fatigue, B    bridge       x10    SAQ      5\" holds     SLS     1' B      Mini side lunges     x10 B      Nu step  L5, 4' L 5, 4' L5, 4'                                                     Ana Caldera, PT, DPT  "

## 2022-03-14 ENCOUNTER — NURSE TRIAGE (OUTPATIENT)
Dept: NURSING | Facility: CLINIC | Age: 72
End: 2022-03-14

## 2022-03-14 ENCOUNTER — ANCILLARY PROCEDURE (OUTPATIENT)
Dept: GENERAL RADIOLOGY | Facility: CLINIC | Age: 72
End: 2022-03-14
Attending: PHYSICIAN ASSISTANT
Payer: MEDICARE

## 2022-03-14 ENCOUNTER — OFFICE VISIT (OUTPATIENT)
Dept: FAMILY MEDICINE | Facility: CLINIC | Age: 72
End: 2022-03-14
Payer: MEDICARE

## 2022-03-14 VITALS
WEIGHT: 171 LBS | RESPIRATION RATE: 16 BRPM | BODY MASS INDEX: 33.96 KG/M2 | HEART RATE: 73 BPM | DIASTOLIC BLOOD PRESSURE: 92 MMHG | OXYGEN SATURATION: 97 % | TEMPERATURE: 99 F | SYSTOLIC BLOOD PRESSURE: 125 MMHG

## 2022-03-14 DIAGNOSIS — J20.9 ACUTE BRONCHITIS, UNSPECIFIED ORGANISM: ICD-10-CM

## 2022-03-14 DIAGNOSIS — R05.9 COUGH: ICD-10-CM

## 2022-03-14 DIAGNOSIS — R05.9 COUGH: Primary | ICD-10-CM

## 2022-03-14 LAB — SARS-COV-2 RNA RESP QL NAA+PROBE: NEGATIVE

## 2022-03-14 PROCEDURE — U0005 INFEC AGEN DETEC AMPLI PROBE: HCPCS | Performed by: PHYSICIAN ASSISTANT

## 2022-03-14 PROCEDURE — 99214 OFFICE O/P EST MOD 30 MIN: CPT | Performed by: PHYSICIAN ASSISTANT

## 2022-03-14 PROCEDURE — U0003 INFECTIOUS AGENT DETECTION BY NUCLEIC ACID (DNA OR RNA); SEVERE ACUTE RESPIRATORY SYNDROME CORONAVIRUS 2 (SARS-COV-2) (CORONAVIRUS DISEASE [COVID-19]), AMPLIFIED PROBE TECHNIQUE, MAKING USE OF HIGH THROUGHPUT TECHNOLOGIES AS DESCRIBED BY CMS-2020-01-R: HCPCS | Performed by: PHYSICIAN ASSISTANT

## 2022-03-14 PROCEDURE — 71046 X-RAY EXAM CHEST 2 VIEWS: CPT | Mod: TC | Performed by: RADIOLOGY

## 2022-03-14 RX ORDER — BENZONATATE 100 MG/1
CAPSULE ORAL
Qty: 45 CAPSULE | Refills: 0 | Status: SHIPPED | OUTPATIENT
Start: 2022-03-14

## 2022-03-14 NOTE — PROGRESS NOTES
Chief Complaint   Patient presents with     Pharyngitis     x4 days     Eye Problem     blurry vision     Fever     Headache       ASSESSMENT/PLAN:  Veronica was seen today for pharyngitis, eye problem, fever and headache.    Diagnoses and all orders for this visit:    Cough  -     XR Chest 2 Views; Future  -     Symptomatic; Yes; 3/10/2022 COVID-19 Virus (Coronavirus) by PCR Nose  -     benzonatate (TESSALON) 100 MG capsule; Take 1-2 capsules by mouth up to 3 x a day as needed with food    Acute bronchitis, unspecified organism    Differential diagnosis includes COVID-19, viral bronchitis or other respiratory infection.  Oxygen normal, no significant shortness of breath.  Had diffuse asymmetrical wheeze that cleared on exam.  X-ray within normal limits.  Likely viral bronchitis and will treat supportively with Tessalon Perles.  If Covid comes back positive and we get it in time today she would be interested in Paxlovid.  If positive and it comes back tomorrow we will discuss treatment with monoclonal antibodies.  I considered TIA but this changes in vision seem related to the symptoms associated with the ear nose and throat pressure.  Normal neurologic exam today.   I would expect the symptoms to not become in going over 5 days if it were a stroke or TIA and is an abnormal presentation for posterior event.    Puneet Shipley PA-C      SUBJECTIVE:  Veronica is a 71 year old female who presents to urgent care with 5 days of cough irritated throat, intermittent chills, headache, malaise and fatigue.  The cough is dry.  2 weeks ago her son had similar symptoms and has not fully recovered.  He was not tested for Covid.  She took an at home Covid test that was negative.  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  She also has a few trips coming up.  She has no shortness of breath, difficulty breathing or wheeze.  No significant nausea, vomiting, diarrhea or abdominal pain.  No sinus pain.  She does  state that she has had intermittent irritated eyes and blurry vision, like when she looks at the TV the words are not clear.  This lasts for minutes to hours and seems to be related to pressure behind her eyes or head.  No double vision.  No gait or balance issues.  She feels like she has some brain fog but no significant memory issues.  No new paresthesias or weakness.    ROS: Pertinent ROS neg other than the symptoms noted above in the HPI.     OBJECTIVE:  BP (!) 125/92 (BP Location: Left arm, Patient Position: Sitting, Cuff Size: Adult Regular)   Pulse 73   Temp 99  F (37.2  C) (Oral)   Resp 16   Wt 77.6 kg (171 lb)   SpO2 97%   BMI 33.96 kg/m     GENERAL: healthy, alert and no distress  EYES: Eyes grossly normal to inspection, PERRL and conjunctivae and sclerae normal  HENT: ear canals and TM's normal, nose and mouth without ulcers or lesions, no posterior pharynx erythema  NECK: no adenopathy, no asymmetry, masses, or scars and thyroid normal to palpation  RESP: lungs clear to auscultation - no rales, rhonchi or wheezes  CV: regular rate and rhythm, normal S1 S2, no S3 or S4, no murmur, click or rub, no peripheral edema and peripheral pulses strong  MS: no gross musculoskeletal defects noted, no edema  SKIN: no suspicious lesions or rashes  NEURO: Normal strength and tone, mentation intact and speech normal, cranial nerves II through XII intact, no nystagmus, upper and lower extremity strength 5/5, gait normal,     DIAGNOSTICS  CXR - Reviewed and interpreted by me Normal- no infiltrates, effusions, pneumothoraces, cardiomegaly or masses  Results for orders placed or performed in visit on 03/14/22   XR Chest 2 Views     Status: None    Narrative    EXAM: XR CHEST 2 VW  LOCATION: [facility]  DATE/TIME: 3/14/2022 3:14 PM    INDICATION: Left asymmetrical wheezing.  COMPARISON: None.      Impression    IMPRESSION: Negative chest. Lungs are clear. Normal heart size.            Current  Outpatient Medications   Medication     b complex vitamins tablet     celecoxib (CELEBREX) 100 MG capsule     cholecalciferol, vitamin D3, (VITAMIN D3 ORAL)     furosemide (LASIX) 20 MG tablet     GLUCOSAMINE HCL ORAL     LORazepam (ATIVAN) 0.5 MG tablet     multivit-minerals/ferrous gluc (MULTI-VALORIE ORAL)     NEXIUM 40 MG DR capsule     vitamin C (ASCORBIC ACID) 100 MG tablet     zolpidem (AMBIEN) 10 MG tablet     diclofenac sodium (VOLTAREN TOP)     No current facility-administered medications for this visit.      Patient Active Problem List   Diagnosis     Chronic thumb pain, bilateral     Fatigue, unspecified type     Heartburn     Primary osteoarthritis involving multiple joints     Hypothyroidism     Gastroesophageal reflux disease without esophagitis     Insomnia     Varicose veins of right lower extremity      Past Medical History:   Diagnosis Date     Family history of diabetes mellitus in mother 1/14/2019     Past Surgical History:   Procedure Laterality Date     ARTHROSCOPY KNEE PROCEDURE CARTILAGE (GENZYME) Right 2011     CATARACT EXTRACTION, BILATERAL Bilateral 2009     Family History   Problem Relation Age of Onset     Diabetes Mother      Social History     Tobacco Use     Smoking status: Current Every Day Smoker     Packs/day: 0.25     Years: 10.00     Pack years: 2.50     Types: Cigarettes     Smokeless tobacco: Never Used     Tobacco comment: Occasional smoking for a month at a time since quitting   Substance Use Topics     Alcohol use: Yes              The plan of care was discussed with the patient. They understand and agree with the course of treatment prescribed. A printed summary was given including instructions and medications.  The use of Dragon/iXpert dictation services may have been used to construct the content in this note; any grammatical or spelling errors are non-intentional. Please contact the author of this note directly if you are in need of any clarification.

## 2022-03-14 NOTE — TELEPHONE ENCOUNTER
"Nurse Triage SBAR    Is this a 2nd Level Triage? NO    Situation   Dry cough  Sore throat  Headache      Background   Day 5 of symptoms. Not getting better. Son lives with them has been sick for over 3 weeks and self-medicating at home.  Pt is vaccinated and boosted against COVID.  Negative at home COVID test.  Pt is primary caregiver for disabled .    Assessment   Dry cough. No SOB.  Blurred vision, \"I can't read the text on the TV.\"  Headache  Sore throat    O2 sats 95% on room air, pulse rate 76 (using oximeter)  No fever at 98F. Takes Aleve.    No SOB. No chest pain.    (See information below for more triage details.)    Recommendation   : Routing to provider for recommendation     Protocol Recommended Disposition: Immediate office evaluation    Per protocol, advised WIC.   Patient prefers to come in to the clinic for a scheduled appointment.  Given symptoms, pt encouraged VV or WIC. Pt declined, requests message sent to care team to see if she can be added to the schedule or if she can be seen in person.     Please call her back at [phone]. FNA informed her that if she does not receive a call in 1 hr, head to WIC. She verbalized agreement.    Stacy De La Cruz RN/[place] Nurse Advisor            Reason for Disposition    HIGH RISK for severe COVID complications (e.g., weak immune system, age > 64 years, obesity with BMI > 25, pregnant, chronic lung disease or other chronic medical condition) (Exception: Already seen by PCP and no new or worsening symptoms.)    Additional Information    Negative: SEVERE difficulty breathing (e.g., struggling for each breath, speaks in single words)    Negative: Difficult to awaken or acting confused (e.g., disoriented, slurred speech)    Negative: Bluish (or gray) lips or face now    Negative: Shock suspected (e.g., cold/pale/clammy skin, too weak to stand, low BP, rapid pulse)    Negative: Sounds like a life-threatening emergency to the triager    Negative: SEVERE or " constant chest pain or pressure  (Exception: Mild central chest pain, present only when coughing.)    Negative: MODERATE difficulty breathing (e.g., speaks in phrases, SOB even at rest, pulse 100-120)    Negative: Headache and stiff neck (can't touch chin to chest)    Negative: Oxygen level (e.g., pulse oximetry) 90 percent or lower    Negative: Chest pain or pressure    Negative: Patient sounds very sick or weak to the triager    Negative: MILD difficulty breathing (e.g., minimal/no SOB at rest, SOB with walking, pulse <100)    Negative: Fever > 103 F (39.4 C)    Negative: [1] Fever > 101 F (38.3 C) AND [2] over 60 years of age    Negative: [1] Fever > 100.0 F (37.8 C) AND [2] bedridden (e.g., nursing home patient, CVA, chronic illness, recovering from surgery)    Protocols used: CORONAVIRUS (COVID-19) DIAGNOSED OR [id number] 1.18.2022

## 2022-03-14 NOTE — PATIENT INSTRUCTIONS
Start taking the cough medication Tessalon Perles as needed.  We should have the Covid test back hopefully today otherwise it will be tomorrow.  If it is positive I will call you and we can discuss starting you on medication to treat Covid.  Let me to start Paxlovid today but we have up to 5 more days to start the monoclonal antibody infusion    Monoclonal antibody treatments are about 90% hospitalization reduction and infusion (within 10 days of symptom onset) through [facility]  COVID-19 Medication Options - [facility] ([email or web address])     Questionnaire for Accessing Limited COVID-19 Treatments ([email or web address].com) Monoclonal antibody infusion via [place] [employer] ([employer]) system  - Providers, patients or someone helping may complete this screening tool.  MD offers a lottery process to refer patients for COVID-19 monoclonal antibody treatment.  If you are not selected on the initial day you apply you can reapply once a day the following days.  You are no longer eligible to receive monoclonal antibody infusion if 10 days have passed since symptom onset.  - Patients may call [phone] with questions about monoclonal antibodies

## 2022-03-22 ENCOUNTER — HOSPITAL ENCOUNTER (OUTPATIENT)
Dept: PHYSICAL THERAPY | Facility: REHABILITATION | Age: 72
Discharge: HOME | End: 2022-03-22
Payer: MEDICARE

## 2022-03-22 DIAGNOSIS — M15.0 PRIMARY OSTEOARTHRITIS INVOLVING MULTIPLE JOINTS: Primary | ICD-10-CM

## 2022-03-22 DIAGNOSIS — M54.41 ACUTE RIGHT-SIDED LOW BACK PAIN WITH RIGHT-SIDED SCIATICA: ICD-10-CM

## 2022-03-22 PROCEDURE — 97112 NEUROMUSCULAR REEDUCATION: CPT | Mod: GP | Performed by: PHYSICAL THERAPIST

## 2022-03-22 PROCEDURE — 97110 THERAPEUTIC EXERCISES: CPT | Mod: GP | Performed by: PHYSICAL THERAPIST

## 2022-03-22 NOTE — PROGRESS NOTES
Outpatient Physical Therapy Discharge Note     Patient: Veronica Collado  : 1950    Beginning/End Dates of Reporting Period:  21 to 3/22/22    Referring Provider: Dr. Younger     Therapy Diagnosis: R pes anserine bursitis, R lumbar radiculitis, R knee OA     Client Self Report: Pt reports that she is leaving in a few hounrs for a trip.  She has been walking and her low back and knees are tolerating the walking but with soreness.  She has been really sick for the past several weeks.  She has not done any of her exercises since her last PT visit - she has been overloaded with life.     Objective Measurements:  See note below for most recent objective information.     Goals: Partially Met   Goal Identifier 1   Goal Description Pt will be independent with management of her condition in order to improve her QOL   Target Date 22   Date Met      Progress (detail required for progress note): in progress     Goal Identifier 2   Goal Description Pt will have decreased GIOVANY by 6 points in order to improve her QOL   Target Date 22   Date Met      Progress (detail required for progress note): in progress     Goal Identifier 3   Goal Description Pt will have less than 2/10 knee pain on a daily basis so she does not need knee surgery.   Target Date 22   Date Met      Progress (detail required for progress note): in progress           Plan:  Discharge from therapy.    Reason for Discharge: Patient has not made expected progress due to interrupted treatment attendance.  Patient has failed to schedule further appointments.    Discharge Plan: Patient to continue home program.      Jessica Mccord, PT       Daily Note    Assessment:   Pt has been ill and overloaded with her life so she has not done her exercises at all.  PT reviewed and gave pt some exercise options for ease while she is on vacation. She tolerated these well.  PT review pes anserine k-tape technique so she can complete this while on  "vacation.  PT recommended that the pt follow-up with her original PT to get some closure on her current episode of PT.      Subj: Pt reports that she is leaving in a few hounrs for a trip.  She has been walking and her low back and knees are tolerating the walking but with soreness.  She has been really sick for the past several weeks.  She has not done any of her exercises since her last PT visit - she has been overloaded with life.     Date 3/22/22 2/25/22 2/16/22 2/4/22 1/26/22 1/21/22 1/14/22 1/4/22 12/28/21   Exercise            SLR Standing hip flexion SLR   Bx10 alt     Standing hip ABD Bx10 alt   Cues for HEP while traveling       Rev'd and terminated. HOLD due to hip flexor compensation.   x15-20   Hip add Reviewed        Reviewed in sitting and supine. VC for correct technique. Best performed in supine with firmer obj. x20 with 5\" holds   clamshell       W/ L2 Until fatigue, B    bridge        x10    SAQ Seated LAQ - cues for travel       5\" holds     SLS R, L x30\"      1' B      Mini side lunges      x10 B      Nu step x6 min   L5, 4' L 5, 4' L5, 4'                                                         Jessica Mccord, PT   "

## 2022-04-26 NOTE — ADDENDUM NOTE
Encounter addended by: Jessica Mccord, PT on: 4/26/2022 9:43 AM   Actions taken: Clinical Note Signed, Episode resolved

## 2022-06-07 ENCOUNTER — MYC MEDICAL ADVICE (OUTPATIENT)
Dept: FAMILY MEDICINE | Facility: CLINIC | Age: 72
End: 2022-06-07
Payer: MEDICARE

## 2022-06-07 DIAGNOSIS — F51.01 PRIMARY INSOMNIA: ICD-10-CM

## 2022-06-07 DIAGNOSIS — F41.9 ANXIETY: ICD-10-CM

## 2022-06-07 RX ORDER — LORAZEPAM 0.5 MG/1
0.5 TABLET ORAL DAILY PRN
Qty: 30 TABLET | Refills: 1 | Status: SHIPPED | OUTPATIENT
Start: 2022-06-07

## 2022-06-07 RX ORDER — ZOLPIDEM TARTRATE 10 MG/1
5 TABLET ORAL
Qty: 90 TABLET | Refills: 1 | Status: SHIPPED | OUTPATIENT
Start: 2022-06-07

## 2022-06-08 RX ORDER — LORAZEPAM 0.5 MG/1
TABLET ORAL
Qty: 30 TABLET | Refills: 1 | OUTPATIENT
Start: 2022-06-08

## 2022-06-08 RX ORDER — ZOLPIDEM TARTRATE 10 MG/1
TABLET ORAL
Qty: 45 TABLET | Refills: 1 | OUTPATIENT
Start: 2022-06-08

## 2022-07-16 ENCOUNTER — HEALTH MAINTENANCE LETTER (OUTPATIENT)
Age: 72
End: 2022-07-16

## 2022-09-18 ENCOUNTER — HEALTH MAINTENANCE LETTER (OUTPATIENT)
Age: 72
End: 2022-09-18

## 2022-10-21 ENCOUNTER — TRANSFERRED RECORDS (OUTPATIENT)
Dept: HEALTH INFORMATION MANAGEMENT | Facility: CLINIC | Age: 72
End: 2022-10-21

## 2023-07-30 ENCOUNTER — HEALTH MAINTENANCE LETTER (OUTPATIENT)
Age: 73
End: 2023-07-30

## 2024-05-28 ENCOUNTER — ANCILLARY PROCEDURE (OUTPATIENT)
Dept: BONE DENSITY | Facility: CLINIC | Age: 74
End: 2024-05-28
Attending: FAMILY MEDICINE
Payer: MEDICARE

## 2024-05-28 DIAGNOSIS — Z78.0 POSTMENOPAUSAL: ICD-10-CM

## 2024-05-28 PROCEDURE — 77089 TBS DXA CAL W/I&R FX RISK: CPT | Performed by: RADIOLOGY

## 2024-05-28 PROCEDURE — 77080 DXA BONE DENSITY AXIAL: CPT | Mod: TC | Performed by: RADIOLOGY

## 2024-09-22 ENCOUNTER — HEALTH MAINTENANCE LETTER (OUTPATIENT)
Age: 74
End: 2024-09-22

## 2025-07-08 ENCOUNTER — TRANSFERRED RECORDS (OUTPATIENT)
Dept: ADMINISTRATIVE | Facility: CLINIC | Age: 75
End: 2025-07-08
Payer: MEDICARE

## 2025-07-09 PROBLEM — D12.6 ADENOMATOUS POLYP OF COLON: Status: ACTIVE | Noted: 2025-07-09

## 2025-07-09 NOTE — PROCEDURES
[facility]   [address], [address], [place] [address]     Patient Name: Veronica Collado  Gender:  Female  Exam Date: 07/08/2025 Visit Number:  [id number]  Age: 74 Years YOB: 1950  Attending MD: Gonsalo Rodas MD Medical Record#:  [id number]    Procedure: Colonoscopy   Indications: Previous advanced adenomatous polyp(s)      Referring MD: Referral Self  Primary MD:      Sarah Beth Barba NP  Medications: Admitting Medications:   0.9% Normal Saline at TKO   Intra Procedure Medications:   Patient received monitored anesthesia care.     Complications: No immediate complications  ______________________________________________________________________________  Procedure:   An examination of the heart and lungs was performed and found to be within acceptable limits.  .  The patient was therefore deemed a reasonable candidate for endoscopy and sedation.   The risks and benefits of the procedure were explained to the patient.After obtaining informed consent, the patient received monitored anesthesia care and I passed the scope   without difficulty via the rectum to the cecum.  The appendiceal orifice and ic valve were identified.  The scope was retroflexed during the examination  The quality of the prep was good  (Miralax/Gatorade Double Prep).    This was a complete examination throughout the entire colon.    Findings:    Polyp location: descending colon.  Quantity: 1.  Size: 7 mm.  Polyp shape:  sessile.         Maneuver: polypectomy was performed with a cold snare w/EMS.       Removal:  complete.  Retrieval: complete.  Bleeding: none.    Diverticulosis.  Location: - sigmoid.    Description:  mild.  Anal canal:  internal hemorrhoid(s)    Impression:  Colorectal polyps  Diverticulosis of colon without diverticulitis  Hemorrhoids, internal    Preliminary Plan:  The patient and their physician will receive a copy of the pathology report as well as pathology-based recommendations for  future screening or surveillance.  Return to your primary care provider as needed.  Pathology Results:  A: COLON, DESCENDING, POLYP:           1. Sessile serrated adenoma           2. Negative for overt dysplasia           3. Per the colonoscopy report:               a. Polyp size: 7 mm               b. Resection: Complete               c. Retrieval: Complete      MICROSCOPIC  A: Performed     Electronically signed by: Harjinder Romano MD    Interpreted at [facility], [address] [place] [phone]    Orders    Instruction(s)/Education:  Instruction/Education Timeframe Assessment   Colon Polyps  K63.5   Diverticulosis/Diverticulitis  K63.5   Hemorrhoids  K63.5       Final Plan:  Repeat colonoscopy in 5 years.    We will attempt to contact you at appropriate intervals via [place] mail.  We may not be able to find you or contact you at that time, therefore you should know that the responsibility for following our recommendation rests with you.  If you don't hear from us at the time your procedure is due, please contact our office to schedule an appointment.  If your contact information should change, please contact our office so that we can update your record.      _Electronically signed by:___________________  Gonsalo Rodas MD                 07/08/2025    cc: Sarah Beth Barba NP

## 2025-08-09 ENCOUNTER — HEALTH MAINTENANCE LETTER (OUTPATIENT)
Age: 75
End: 2025-08-09